# Patient Record
Sex: MALE | Race: WHITE | NOT HISPANIC OR LATINO | Employment: OTHER | URBAN - METROPOLITAN AREA
[De-identification: names, ages, dates, MRNs, and addresses within clinical notes are randomized per-mention and may not be internally consistent; named-entity substitution may affect disease eponyms.]

---

## 2017-03-14 ENCOUNTER — COMMUNICATION - HEALTHEAST (OUTPATIENT)
Dept: FAMILY MEDICINE | Facility: CLINIC | Age: 77
End: 2017-03-14

## 2017-03-14 DIAGNOSIS — I10 ESSENTIAL HYPERTENSION, BENIGN: ICD-10-CM

## 2017-06-19 ENCOUNTER — COMMUNICATION - HEALTHEAST (OUTPATIENT)
Dept: FAMILY MEDICINE | Facility: CLINIC | Age: 77
End: 2017-06-19

## 2017-06-19 DIAGNOSIS — I10 ESSENTIAL HYPERTENSION, BENIGN: ICD-10-CM

## 2017-08-23 ENCOUNTER — OFFICE VISIT - HEALTHEAST (OUTPATIENT)
Dept: FAMILY MEDICINE | Facility: CLINIC | Age: 77
End: 2017-08-23

## 2017-08-23 DIAGNOSIS — R26.81 UNSTEADY GAIT: ICD-10-CM

## 2017-08-23 DIAGNOSIS — R35.0 URINARY FREQUENCY: ICD-10-CM

## 2017-08-23 DIAGNOSIS — Z00.00 WELL ADULT EXAM: ICD-10-CM

## 2017-08-23 DIAGNOSIS — M41.9 SCOLIOSIS: ICD-10-CM

## 2017-08-23 DIAGNOSIS — Z95.5 HISTORY OF HEART ARTERY STENT: ICD-10-CM

## 2017-08-23 DIAGNOSIS — M54.50 LOW BACK PAIN: ICD-10-CM

## 2017-08-23 DIAGNOSIS — I25.10 CORONARY ATHEROSCLEROSIS: ICD-10-CM

## 2017-08-23 DIAGNOSIS — I10 ESSENTIAL HYPERTENSION, BENIGN: ICD-10-CM

## 2017-08-23 DIAGNOSIS — E78.49 OTHER HYPERLIPIDEMIA: ICD-10-CM

## 2017-08-23 DIAGNOSIS — Z86.12 HISTORY OF POLIOMYELITIS: ICD-10-CM

## 2017-08-23 DIAGNOSIS — M19.90 OSTEOARTHRITIS: ICD-10-CM

## 2017-08-23 ASSESSMENT — MIFFLIN-ST. JEOR: SCORE: 1517.49

## 2017-08-25 ENCOUNTER — AMBULATORY - HEALTHEAST (OUTPATIENT)
Dept: LAB | Facility: CLINIC | Age: 77
End: 2017-08-25

## 2017-08-25 DIAGNOSIS — E78.49 OTHER HYPERLIPIDEMIA: ICD-10-CM

## 2017-08-25 DIAGNOSIS — Z00.00 WELL ADULT EXAM: ICD-10-CM

## 2017-08-25 DIAGNOSIS — R35.0 URINARY FREQUENCY: ICD-10-CM

## 2017-08-25 LAB
CHOLEST SERPL-MCNC: 112 MG/DL
FASTING STATUS PATIENT QL REPORTED: YES
HDLC SERPL-MCNC: 41 MG/DL
LDLC SERPL CALC-MCNC: 50 MG/DL
PSA SERPL-MCNC: 1 NG/ML (ref 0–6.5)
TRIGL SERPL-MCNC: 103 MG/DL

## 2017-09-18 ENCOUNTER — RECORDS - HEALTHEAST (OUTPATIENT)
Dept: ADMINISTRATIVE | Facility: OTHER | Age: 77
End: 2017-09-18

## 2017-10-10 ENCOUNTER — RECORDS - HEALTHEAST (OUTPATIENT)
Dept: ADMINISTRATIVE | Facility: OTHER | Age: 77
End: 2017-10-10

## 2017-10-23 ENCOUNTER — RECORDS - HEALTHEAST (OUTPATIENT)
Dept: ADMINISTRATIVE | Facility: OTHER | Age: 77
End: 2017-10-23

## 2017-12-07 ENCOUNTER — COMMUNICATION - HEALTHEAST (OUTPATIENT)
Dept: FAMILY MEDICINE | Facility: CLINIC | Age: 77
End: 2017-12-07

## 2017-12-11 ENCOUNTER — OFFICE VISIT - HEALTHEAST (OUTPATIENT)
Dept: FAMILY MEDICINE | Facility: CLINIC | Age: 77
End: 2017-12-11

## 2017-12-11 DIAGNOSIS — Z86.12 HISTORY OF POLIOMYELITIS: ICD-10-CM

## 2017-12-11 DIAGNOSIS — M25.511 RIGHT SHOULDER PAIN: ICD-10-CM

## 2017-12-11 DIAGNOSIS — R29.898 WEAKNESS OF RIGHT ARM: ICD-10-CM

## 2017-12-11 ASSESSMENT — MIFFLIN-ST. JEOR: SCORE: 1522.6

## 2017-12-12 ENCOUNTER — HOSPITAL ENCOUNTER (OUTPATIENT)
Dept: MRI IMAGING | Facility: HOSPITAL | Age: 77
Discharge: HOME | End: 2017-12-12
Attending: FAMILY MEDICINE

## 2017-12-12 DIAGNOSIS — M25.511 RIGHT SHOULDER PAIN: ICD-10-CM

## 2017-12-13 ENCOUNTER — AMBULATORY - HEALTHEAST (OUTPATIENT)
Dept: FAMILY MEDICINE | Facility: CLINIC | Age: 77
End: 2017-12-13

## 2017-12-15 ENCOUNTER — RECORDS - HEALTHEAST (OUTPATIENT)
Dept: ADMINISTRATIVE | Facility: OTHER | Age: 77
End: 2017-12-15

## 2018-07-24 ENCOUNTER — OFFICE VISIT - HEALTHEAST (OUTPATIENT)
Dept: FAMILY MEDICINE | Facility: CLINIC | Age: 78
End: 2018-07-24

## 2018-07-24 DIAGNOSIS — I25.10 CORONARY ATHEROSCLEROSIS: ICD-10-CM

## 2018-07-24 DIAGNOSIS — Z95.5 HISTORY OF HEART ARTERY STENT: ICD-10-CM

## 2018-07-24 DIAGNOSIS — H26.9 BILATERAL CATARACTS: ICD-10-CM

## 2018-07-24 DIAGNOSIS — G54.0 BRACHIAL PLEXOPATHY: ICD-10-CM

## 2018-07-24 DIAGNOSIS — E78.49 OTHER HYPERLIPIDEMIA: ICD-10-CM

## 2018-07-24 DIAGNOSIS — I10 ESSENTIAL HYPERTENSION, BENIGN: ICD-10-CM

## 2018-07-24 DIAGNOSIS — R33.9 INCOMPLETE BLADDER EMPTYING: ICD-10-CM

## 2018-07-24 DIAGNOSIS — Z86.12 HISTORY OF POLIOMYELITIS: ICD-10-CM

## 2018-07-24 DIAGNOSIS — Z01.818 ENCOUNTER FOR PREOPERATIVE EXAMINATION FOR GENERAL SURGICAL PROCEDURE: ICD-10-CM

## 2018-07-24 ASSESSMENT — MIFFLIN-ST. JEOR: SCORE: 1521.18

## 2018-09-08 ENCOUNTER — COMMUNICATION - HEALTHEAST (OUTPATIENT)
Dept: FAMILY MEDICINE | Facility: CLINIC | Age: 78
End: 2018-09-08

## 2018-09-08 DIAGNOSIS — I10 ESSENTIAL HYPERTENSION, BENIGN: ICD-10-CM

## 2018-09-08 DIAGNOSIS — E78.49 OTHER HYPERLIPIDEMIA: ICD-10-CM

## 2018-09-09 ENCOUNTER — COMMUNICATION - HEALTHEAST (OUTPATIENT)
Dept: FAMILY MEDICINE | Facility: CLINIC | Age: 78
End: 2018-09-09

## 2018-09-09 DIAGNOSIS — I10 ESSENTIAL HYPERTENSION, BENIGN: ICD-10-CM

## 2019-09-04 ENCOUNTER — COMMUNICATION - HEALTHEAST (OUTPATIENT)
Dept: FAMILY MEDICINE | Facility: CLINIC | Age: 79
End: 2019-09-04

## 2019-09-04 DIAGNOSIS — E78.49 OTHER HYPERLIPIDEMIA: ICD-10-CM

## 2019-09-04 DIAGNOSIS — I10 ESSENTIAL HYPERTENSION, BENIGN: ICD-10-CM

## 2019-09-10 ENCOUNTER — RECORDS - HEALTHEAST (OUTPATIENT)
Dept: ADMINISTRATIVE | Facility: OTHER | Age: 79
End: 2019-09-10

## 2019-09-23 ENCOUNTER — OFFICE VISIT - HEALTHEAST (OUTPATIENT)
Dept: FAMILY MEDICINE | Facility: CLINIC | Age: 79
End: 2019-09-23

## 2019-09-23 DIAGNOSIS — Z95.5 HISTORY OF HEART ARTERY STENT: ICD-10-CM

## 2019-09-23 DIAGNOSIS — Z86.12 HISTORY OF POLIOMYELITIS: ICD-10-CM

## 2019-09-23 DIAGNOSIS — E78.49 OTHER HYPERLIPIDEMIA: ICD-10-CM

## 2019-09-23 DIAGNOSIS — R33.9 INCOMPLETE BLADDER EMPTYING: ICD-10-CM

## 2019-09-23 DIAGNOSIS — M79.645 BILATERAL THUMB PAIN: ICD-10-CM

## 2019-09-23 DIAGNOSIS — R20.2 NUMBNESS AND TINGLING IN BOTH HANDS: ICD-10-CM

## 2019-09-23 DIAGNOSIS — R20.0 NUMBNESS AND TINGLING IN BOTH HANDS: ICD-10-CM

## 2019-09-23 DIAGNOSIS — I10 ESSENTIAL HYPERTENSION, BENIGN: ICD-10-CM

## 2019-09-23 DIAGNOSIS — Z12.5 SCREENING FOR PROSTATE CANCER: ICD-10-CM

## 2019-09-23 DIAGNOSIS — Z00.01 ENCOUNTER FOR GENERAL ADULT MEDICAL EXAMINATION WITH ABNORMAL FINDINGS: ICD-10-CM

## 2019-09-23 DIAGNOSIS — I25.10 ATHEROSCLEROSIS OF NATIVE CORONARY ARTERY OF NATIVE HEART WITHOUT ANGINA PECTORIS: ICD-10-CM

## 2019-09-23 DIAGNOSIS — R26.81 UNSTEADY GAIT: ICD-10-CM

## 2019-09-23 DIAGNOSIS — M41.9 SCOLIOSIS, UNSPECIFIED SCOLIOSIS TYPE, UNSPECIFIED SPINAL REGION: ICD-10-CM

## 2019-09-23 DIAGNOSIS — M79.644 BILATERAL THUMB PAIN: ICD-10-CM

## 2019-09-23 LAB
ALBUMIN SERPL-MCNC: 3.7 G/DL (ref 3.5–5)
ALP SERPL-CCNC: 64 U/L (ref 45–120)
ALT SERPL W P-5'-P-CCNC: 24 U/L (ref 0–45)
ANION GAP SERPL CALCULATED.3IONS-SCNC: 8 MMOL/L (ref 5–18)
AST SERPL W P-5'-P-CCNC: 22 U/L (ref 0–40)
BILIRUB SERPL-MCNC: 0.9 MG/DL (ref 0–1)
BUN SERPL-MCNC: 18 MG/DL (ref 8–28)
CALCIUM SERPL-MCNC: 9.4 MG/DL (ref 8.5–10.5)
CHLORIDE BLD-SCNC: 106 MMOL/L (ref 98–107)
CHOLEST SERPL-MCNC: 111 MG/DL
CO2 SERPL-SCNC: 29 MMOL/L (ref 22–31)
CREAT SERPL-MCNC: 0.71 MG/DL (ref 0.7–1.3)
FASTING STATUS PATIENT QL REPORTED: NORMAL
GFR SERPL CREATININE-BSD FRML MDRD: >60 ML/MIN/1.73M2
GLUCOSE BLD-MCNC: 96 MG/DL (ref 70–125)
HDLC SERPL-MCNC: 44 MG/DL
LDLC SERPL CALC-MCNC: 46 MG/DL
POTASSIUM BLD-SCNC: 4.4 MMOL/L (ref 3.5–5)
PROT SERPL-MCNC: 6.4 G/DL (ref 6–8)
PSA SERPL-MCNC: 1.1 NG/ML (ref 0–6.5)
SODIUM SERPL-SCNC: 143 MMOL/L (ref 136–145)
TRIGL SERPL-MCNC: 107 MG/DL

## 2019-09-23 ASSESSMENT — MIFFLIN-ST. JEOR: SCORE: 1529.46

## 2019-09-30 ENCOUNTER — RECORDS - HEALTHEAST (OUTPATIENT)
Dept: ADMINISTRATIVE | Facility: OTHER | Age: 79
End: 2019-09-30

## 2019-10-14 ENCOUNTER — RECORDS - HEALTHEAST (OUTPATIENT)
Dept: ADMINISTRATIVE | Facility: OTHER | Age: 79
End: 2019-10-14

## 2019-10-21 ENCOUNTER — RECORDS - HEALTHEAST (OUTPATIENT)
Dept: ADMINISTRATIVE | Facility: OTHER | Age: 79
End: 2019-10-21

## 2020-01-09 ENCOUNTER — RECORDS - HEALTHEAST (OUTPATIENT)
Dept: ADMINISTRATIVE | Facility: OTHER | Age: 80
End: 2020-01-09

## 2020-03-02 ENCOUNTER — HEALTH MAINTENANCE LETTER (OUTPATIENT)
Age: 80
End: 2020-03-02

## 2020-07-31 ENCOUNTER — RECORDS - HEALTHEAST (OUTPATIENT)
Dept: ADMINISTRATIVE | Facility: OTHER | Age: 80
End: 2020-07-31

## 2020-08-14 ENCOUNTER — COMMUNICATION - HEALTHEAST (OUTPATIENT)
Dept: FAMILY MEDICINE | Facility: CLINIC | Age: 80
End: 2020-08-14

## 2020-08-19 ENCOUNTER — COMMUNICATION - HEALTHEAST (OUTPATIENT)
Dept: FAMILY MEDICINE | Facility: CLINIC | Age: 80
End: 2020-08-19

## 2020-08-20 ENCOUNTER — OFFICE VISIT - HEALTHEAST (OUTPATIENT)
Dept: FAMILY MEDICINE | Facility: CLINIC | Age: 80
End: 2020-08-20

## 2020-08-20 DIAGNOSIS — Z95.5 HISTORY OF HEART ARTERY STENT: ICD-10-CM

## 2020-08-20 DIAGNOSIS — M24.021 LOOSE BODY IN RIGHT ELBOW: ICD-10-CM

## 2020-08-20 DIAGNOSIS — Z01.818 PREOP GENERAL PHYSICAL EXAM: ICD-10-CM

## 2020-08-20 DIAGNOSIS — M19.021 OSTEOARTHRITIS OF RIGHT ELBOW, UNSPECIFIED OSTEOARTHRITIS TYPE: ICD-10-CM

## 2020-08-20 DIAGNOSIS — G56.21 CUBITAL TUNNEL SYNDROME ON RIGHT: ICD-10-CM

## 2020-08-20 DIAGNOSIS — R33.9 INCOMPLETE BLADDER EMPTYING: ICD-10-CM

## 2020-08-20 DIAGNOSIS — I25.10 ATHEROSCLEROSIS OF NATIVE CORONARY ARTERY OF NATIVE HEART WITHOUT ANGINA PECTORIS: ICD-10-CM

## 2020-08-20 DIAGNOSIS — I10 ESSENTIAL HYPERTENSION, BENIGN: ICD-10-CM

## 2020-08-20 DIAGNOSIS — E78.49 OTHER HYPERLIPIDEMIA: ICD-10-CM

## 2020-08-20 DIAGNOSIS — Z86.12 HISTORY OF POLIOMYELITIS: ICD-10-CM

## 2020-08-20 LAB
ALBUMIN SERPL-MCNC: 3.8 G/DL (ref 3.5–5)
ALP SERPL-CCNC: 54 U/L (ref 45–120)
ALT SERPL W P-5'-P-CCNC: 27 U/L (ref 0–45)
ANION GAP SERPL CALCULATED.3IONS-SCNC: 10 MMOL/L (ref 5–18)
AST SERPL W P-5'-P-CCNC: 22 U/L (ref 0–40)
ATRIAL RATE - MUSE: 74 BPM
BILIRUB SERPL-MCNC: 1.2 MG/DL (ref 0–1)
BUN SERPL-MCNC: 16 MG/DL (ref 8–28)
CALCIUM SERPL-MCNC: 9.2 MG/DL (ref 8.5–10.5)
CHLORIDE BLD-SCNC: 104 MMOL/L (ref 98–107)
CHOLEST SERPL-MCNC: 116 MG/DL
CO2 SERPL-SCNC: 26 MMOL/L (ref 22–31)
CREAT SERPL-MCNC: 0.75 MG/DL (ref 0.7–1.3)
DIASTOLIC BLOOD PRESSURE - MUSE: NORMAL
FASTING STATUS PATIENT QL REPORTED: YES
GFR SERPL CREATININE-BSD FRML MDRD: >60 ML/MIN/1.73M2
GLUCOSE BLD-MCNC: 93 MG/DL (ref 70–125)
HDLC SERPL-MCNC: 43 MG/DL
INTERPRETATION ECG - MUSE: NORMAL
LDLC SERPL CALC-MCNC: 53 MG/DL
P AXIS - MUSE: 40 DEGREES
POTASSIUM BLD-SCNC: 4.2 MMOL/L (ref 3.5–5)
PR INTERVAL - MUSE: 186 MS
PROT SERPL-MCNC: 6.4 G/DL (ref 6–8)
QRS DURATION - MUSE: 88 MS
QT - MUSE: 372 MS
QTC - MUSE: 412 MS
R AXIS - MUSE: 25 DEGREES
SODIUM SERPL-SCNC: 140 MMOL/L (ref 136–145)
SYSTOLIC BLOOD PRESSURE - MUSE: NORMAL
T AXIS - MUSE: 62 DEGREES
TRIGL SERPL-MCNC: 99 MG/DL
VENTRICULAR RATE- MUSE: 74 BPM

## 2020-08-20 ASSESSMENT — MIFFLIN-ST. JEOR: SCORE: 1533.2

## 2020-08-27 ENCOUNTER — RECORDS - HEALTHEAST (OUTPATIENT)
Dept: ADMINISTRATIVE | Facility: OTHER | Age: 80
End: 2020-08-27

## 2020-09-08 ENCOUNTER — RECORDS - HEALTHEAST (OUTPATIENT)
Dept: ADMINISTRATIVE | Facility: OTHER | Age: 80
End: 2020-09-08

## 2020-09-09 ENCOUNTER — RECORDS - HEALTHEAST (OUTPATIENT)
Dept: ADMINISTRATIVE | Facility: OTHER | Age: 80
End: 2020-09-09

## 2020-09-10 ENCOUNTER — RECORDS - HEALTHEAST (OUTPATIENT)
Dept: ADMINISTRATIVE | Facility: OTHER | Age: 80
End: 2020-09-10

## 2020-09-10 ENCOUNTER — RECORDS - HEALTHEAST (OUTPATIENT)
Dept: LAB | Facility: CLINIC | Age: 80
End: 2020-09-10

## 2020-09-10 ENCOUNTER — COMMUNICATION - HEALTHEAST (OUTPATIENT)
Dept: FAMILY MEDICINE | Facility: CLINIC | Age: 80
End: 2020-09-10

## 2020-09-13 LAB
BACTERIA SPEC CULT: NO GROWTH
BACTERIA SPEC CULT: NORMAL
GRAM STAIN RESULT: NORMAL
GRAM STAIN RESULT: NORMAL

## 2020-09-14 ENCOUNTER — RECORDS - HEALTHEAST (OUTPATIENT)
Dept: ADMINISTRATIVE | Facility: OTHER | Age: 80
End: 2020-09-14

## 2020-09-21 ENCOUNTER — RECORDS - HEALTHEAST (OUTPATIENT)
Dept: ADMINISTRATIVE | Facility: OTHER | Age: 80
End: 2020-09-21

## 2020-10-12 ENCOUNTER — RECORDS - HEALTHEAST (OUTPATIENT)
Dept: ADMINISTRATIVE | Facility: OTHER | Age: 80
End: 2020-10-12

## 2020-12-20 ENCOUNTER — HEALTH MAINTENANCE LETTER (OUTPATIENT)
Age: 80
End: 2020-12-20

## 2021-02-25 ENCOUNTER — IMMUNIZATION (OUTPATIENT)
Dept: NURSING | Facility: CLINIC | Age: 81
End: 2021-02-25
Payer: COMMERCIAL

## 2021-02-25 PROCEDURE — 91300 PR COVID VAC PFIZER DIL RECON 30 MCG/0.3 ML IM: CPT

## 2021-02-25 PROCEDURE — 0001A PR COVID VAC PFIZER DIL RECON 30 MCG/0.3 ML IM: CPT

## 2021-03-18 ENCOUNTER — IMMUNIZATION (OUTPATIENT)
Dept: NURSING | Facility: CLINIC | Age: 81
End: 2021-03-18
Attending: INTERNAL MEDICINE
Payer: COMMERCIAL

## 2021-03-18 PROCEDURE — 91300 PR COVID VAC PFIZER DIL RECON 30 MCG/0.3 ML IM: CPT

## 2021-03-18 PROCEDURE — 0002A PR COVID VAC PFIZER DIL RECON 30 MCG/0.3 ML IM: CPT

## 2021-04-24 ENCOUNTER — HEALTH MAINTENANCE LETTER (OUTPATIENT)
Age: 81
End: 2021-04-24

## 2021-05-29 ENCOUNTER — RECORDS - HEALTHEAST (OUTPATIENT)
Dept: ADMINISTRATIVE | Facility: CLINIC | Age: 81
End: 2021-05-29

## 2021-05-31 VITALS — WEIGHT: 184.31 LBS | BODY MASS INDEX: 27.93 KG/M2 | HEIGHT: 68 IN

## 2021-05-31 VITALS — BODY MASS INDEX: 27.76 KG/M2 | WEIGHT: 183.19 LBS | HEIGHT: 68 IN

## 2021-06-01 ENCOUNTER — RECORDS - HEALTHEAST (OUTPATIENT)
Dept: ADMINISTRATIVE | Facility: CLINIC | Age: 81
End: 2021-06-01

## 2021-06-01 VITALS — BODY MASS INDEX: 27.89 KG/M2 | WEIGHT: 184 LBS | HEIGHT: 68 IN

## 2021-06-01 NOTE — TELEPHONE ENCOUNTER
Please contact this patient and help him schedule follow-up appointment/physical.  He is due to have labs checked.  I can refill the medications he needs for now, but he needs to be seen for follow-up visit. -DE

## 2021-06-01 NOTE — TELEPHONE ENCOUNTER
RN cannot approve Refill Request    RN can NOT refill this medication Protocol failed and NO refill given      Kellen Galvan, [facility] Triage/Med Refill 9/4/2019    Requested Prescriptions   Pending Prescriptions Disp Refills     metoprolol succinate (TOPROL-XL) 50 MG 24 hr tablet [Pharmacy Med Name: METOPROLOL SUCC ER 50 MG TAB] 90 tablet 3     Sig: TAKE ONE TABLET BY MOUTH ONE TIME DAILY       Beta-Blockers Refill Protocol Failed - 9/4/2019  1:18 AM        Failed - PCP or prescribing provider visit in past 12 months or next 3 months     Last office visit with prescriber/PCP: 12/11/2017 Silvano Naranjo DO OR same dept: Visit date not found OR same specialty: 12/11/2017 Silvano Naranjo DO  Last physical: 7/24/2018 Last MTM visit: Visit date not found   Next visit within 3 mo: Visit date not found  Next physical within 3 mo: Visit date not found  Prescriber OR PCP: Silvano Montenegro DO  Last diagnosis associated with med order: 1. Benign Essential Hypertension  - metoprolol succinate (TOPROL-XL) 50 MG 24 hr tablet [Pharmacy Med Name: METOPROLOL SUCC ER 50 MG TAB]; TAKE ONE TABLET BY MOUTH ONE TIME DAILY  Dispense: 90 tablet; Refill: 3  - losartan (COZAAR) 50 MG tablet [Pharmacy Med Name: LOSARTAN POTASSIUM 50 MG TAB]; TAKE ONE TABLET BY MOUTH ONE TIME DAILY  Dispense: 90 tablet; Refill: 3    2. Other hyperlipidemia  - atorvastatin (LIPITOR) 10 MG tablet [Pharmacy Med Name: ATORVASTATIN 10 MG TABLET]; TAKE ONE TABLET BY MOUTH EVERY DAY AS DIRECTED  Dispense: 90 tablet; Refill: 3    If protocol passes may refill for 12 months if within 3 months of last provider visit (or a total of 15 months).             Failed - Blood pressure filed in past 12 months     BP Readings from Last 1 Encounters:   07/24/18 110/64             atorvastatin (LIPITOR) 10 MG tablet [Pharmacy Med Name: ATORVASTATIN 10 MG TABLET] 90 tablet 3     Sig: TAKE ONE TABLET BY MOUTH EVERY DAY AS DIRECTED        Statins Refill Protocol (Hmg CoA Reductase Inhibitors) Failed - 9/4/2019  1:18 AM        Failed - PCP or prescribing provider visit in past 12 months      Last office visit with prescriber/PCP: 12/11/2017 Silvano Naranjo DO OR [facility] de[facility]: Visit date not found OR same specialty: 12/11/2017 Silvano Naranjo DO  Last physical: 7/24/2018 Last MTM visit: Visit date not found   Next visit within 3 mo: Visit date not found  Next physical within 3 mo: Visit date not found  Prescriber OR PCP: Silvano Montenegro DO  Last diagnosis associated with med order: 1. Benign Essential Hypertension  - metoprolol succinate (TOPROL-XL) 50 MG 24 hr tablet [Pharmacy Med Name: METOPROLOL SUCC ER 50 MG TAB]; TAKE ONE TABLET BY MOUTH ONE TIME DAILY  Dispense: 90 tablet; Refill: 3  - losartan (COZAAR) 50 MG tablet [Pharmacy Med Name: LOSARTAN POTASSIUM 50 MG TAB]; TAKE ONE TABLET BY MOUTH ONE TIME DAILY  Dispense: 90 tablet; Refill: 3    2. Other hyperlipidemia  - atorvastatin (LIPITOR) 10 MG tablet [Pharmacy Med Name: ATORVASTATIN 10 MG TABLET]; TAKE ONE TABLET BY MOUTH EVERY DAY AS DIRECTED  Dispense: 90 tablet; Refill: 3    If protocol passes may refill for 12 months if within 3 months of last provider visit (or a total of 15 months).             losartan (COZAAR) 50 MG tablet [Pharmacy Med Name: LOSARTAN POTASSIUM 50 MG TAB] 90 tablet 3     Sig: TAKE ONE TABLET BY MOUTH ONE TIME DAILY       Angiotensin Receptor Blocker Protocol Failed - 9/4/2019  1:18 AM        Failed - PCP or prescribing provider visit in past 12 months       Last office visit with prescriber/PCP: 12/11/2017 Silvano Naranjo DO OR [facility] de[facility]: Visit date not found OR same specialty: 12/11/2017 Silvano Naranjo DO  Last physical: 7/24/2018 Last MTM visit: Visit date not found   Next visit within 3 mo: Visit date not found  Next physical within 3 mo: Visit date not found  Prescriber OR PCP: Silvano Rodriguez  DO Lan  Last diagnosis associated with med order: 1. Benign Essential Hypertension  - metoprolol succinate (TOPROL-XL) 50 MG 24 hr tablet [Pharmacy Med Name: METOPROLOL SUCC ER 50 MG TAB]; TAKE ONE TABLET BY MOUTH ONE TIME DAILY  Dispense: 90 tablet; Refill: 3  - losartan (COZAAR) 50 MG tablet [Pharmacy Med Name: LOSARTAN POTASSIUM 50 MG TAB]; TAKE ONE TABLET BY MOUTH ONE TIME DAILY  Dispense: 90 tablet; Refill: 3    2. Other hyperlipidemia  - atorvastatin (LIPITOR) 10 MG tablet [Pharmacy Med Name: ATORVASTATIN 10 MG TABLET]; TAKE ONE TABLET BY MOUTH EVERY DAY AS DIRECTED  Dispense: 90 tablet; Refill: 3    If protocol passes may refill for 12 months if within 3 months of last provider visit (or a total of 15 months).             Failed - Serum potassium within the past 12 months     No results found for: LN-POTASSIUM          Failed - Blood pressure filed in past 12 months     BP Readings from Last 1 Encounters:   07/24/18 110/64             Failed - Serum creatinine within the past 12 months     Creatinine   Date Value Ref Range Status   08/25/2017 0.71 0.70 - 1.30 mg/dL Final

## 2021-06-01 NOTE — PROGRESS NOTES
Assessment and Plan:     1. Encounter for general adult medical examination with abnormal findings  I encouraged him to eat a healthy diet and get regular exercise.  We are going to check labs today.    2. Other hyperlipidemia  He will continue atorvastatin we are going to check a cholesterol panel and CMP today.  - atorvastatin (LIPITOR) 10 MG tablet; Take 1 tablet (10 mg total) by mouth daily. As directed  Dispense: 90 tablet; Refill: 3  - Comprehensive Metabolic Panel  - Lipid Cascade    3. Benign Essential Hypertension  Blood pressures well controlled on losartan and metoprolol.  Refills were given.  - losartan (COZAAR) 50 MG tablet; Take 1 tablet (50 mg total) by mouth daily.  Dispense: 90 tablet; Refill: 3  - metoprolol succinate (TOPROL-XL) 50 MG 24 hr tablet; Take 1 tablet (50 mg total) by mouth daily.  Dispense: 90 tablet; Refill: 3    4. Bilateral thumb pain  The pain at the base of both of his thumbs is likely secondary to osteoarthritis.  He was given a referral to see an orthopedic hand specialist about this.  - Ambulatory referral to Orthopedic Surgery    5. Numbness and tingling in both hands  Numbness and tingling symptoms in both of his hands is consistent with carpal tunnel syndrome.  He was given a referral to see an orthopedic hand specialist regarding this issue as well  - Ambulatory referral to Orthopedic Surgery    6. Atherosclerosis of native coronary artery of native heart without angina pectoris  Stable on atorvastatin, aspirin, metoprolol and losartan.    7. History of heart artery stent  Stable.    8. History of poliomyelitis  He may continue to use walking poles to help with ambulation.    9. Scoliosis, unspecified scoliosis type, unspecified spinal region  He will continue to stay physically active.    10. Unsteady gait  He may continue to use walking poles to help with ambulation.    11. Incomplete bladder emptying  - tamsulosin (FLOMAX) 0.4 mg cap; Take 1 capsule (0.4 mg total) by  mouth Daily after breakfast.  Dispense: 90 capsule; Refill: 3    12. Screening for prostate cancer  - PSA (Prostatic-Specific Antigen), Annual Screen     The patient's current medical problems were reviewed.      The following health maintenance schedule was reviewed with the patient and provided in printed form in the after visit summary:   Health Maintenance   Topic Date Due     ZOSTER VACCINES (1 of 2) 07/01/1992     PNEUMOCOCCAL IMMUNIZATION 65+ LOW/MEDIUM RISK (1 of 2 - PCV13) 07/01/2007     MEDICARE ANNUAL WELLNESS VISIT  08/23/2018     INFLUENZA VACCINE RULE BASED (1) 08/01/2019     FALL RISK ASSESSMENT  09/23/2020     ADVANCE CARE PLANNING  05/25/2021     TD 18+ HE  10/17/2021        Subjective:   Chief Complaint: Silvano Cruz is an 79 y.o. male here for an Annual Wellness visit.   HPI:  He has a history of coronary artery disease and a subsequent stent placement in 2009, hypertention, hyperlipidemia, incomplete bladder emptying, right upper extremity brachial plexopathy, scoliosis and polio.  He is on losartan, metoprolol and low dose of atorvastatin which he tolerates well.  He is due for refills of these medications.  He is concerned today about pain at the base of both thumbs and numbness and tingling symptoms he has been experiencing in both hands at night.  The symptoms of been going on for the past 3-4 months.  He denies any recent injuries.  However, he remembers having a crushing injury to the left thumb/hand about 20 years ago.  There were no fractures discovered at that time.    Social history:      Review of Systems:   Please see above.  The rest of the review of systems are negative for all systems.    Patient Care Team:  Silvano Naranjo DO as PCP - General (Family Medicine)  Silvano Naranjo DO as Assigned PCP     Patient Active Problem List   Diagnosis     Seborrheic Keratosis     Osteoarthritis     Hyperlipidemia     Benign Essential Hypertension      Coronary Artery Disease     Scoliosis     History of poliomyelitis     History of heart artery stent     Unsteady gait     Brachial plexopathy     Incomplete bladder emptying     No past medical history on file.   Past Surgical History:   Procedure Laterality Date     ANKLE FUSION Bilateral Right 2015, Left at age 15     TOTAL KNEE ARTHROPLASTY Right 2009      Family History   Problem Relation Age of Onset     Stroke Mother      Lung cancer Father      Harley's esophagus Father       Social History     Socioeconomic History     Marital status:      Spouse name: Not on file     Number of children: Not on file     Years of education: Not on file     Highest education level: Not on file   Occupational History     Not on file   Social Needs     Financial resource strain: Not on file     Food insecurity:     Worry: Not on file     Inability: Not on file     Transportation needs:     Medical: Not on file     Non-medical: Not on file   Tobacco Use     Smoking status: Never Smoker     Smokeless tobacco: Never Used   Substance and Sexual Activity     Alcohol use: Yes     Comment: alittle     Drug use: Never     Sexual activity: Not on file   Lifestyle     Physical activity:     Days per week: Not on file     Minutes per session: Not on file     Stress: Not on file   Relationships     Social connections:     Talks on phone: Not on file     Gets together: Not on file     Attends [employer]ical service: Not on file     Active member of club or organization: Not on file     Attends meetings of clubs or organizations: Not on file     Relationship status: Not on file     Intimate partner violence:     Fear of current or ex partner: Not on file     Emotionally abused: Not on file     Physically abused: Not on file     Forced sexual activity: Not on file   Other Topics Concern     Not on file   Social History Narrative     Not on file      Current Outpatient Medications   Medication Sig Dispense Refill     aspirin 81 MG EC tablet  "Take 81 mg by mouth daily.       atorvastatin (LIPITOR) 10 MG tablet TAKE ONE TABLET BY MOUTH EVERY DAY AS DIRECTED 90 tablet 3     losartan (COZAAR) 50 MG tablet TAKE ONE TABLET BY MOUTH ONE TIME DAILY 90 tablet 3     metoprolol succinate (TOPROL-XL) 50 MG 24 hr tablet TAKE ONE TABLET BY MOUTH ONE TIME DAILY 90 tablet 3     tamsulosin (FLOMAX) 0.4 mg Cp24 daily.  5     acetaminophen (TYLENOL EXTRA STRENGTH) 500 MG tablet Take 500 mg by mouth every 4 (four) hours as needed for pain.       prednisoLONE acetate (PRED-FORTE) 1 % ophthalmic suspension        No current facility-administered medications for this visit.       Objective:   Vital Signs:   Visit Vitals  /70 (Patient Site: Left Arm, Patient Position: Sitting, Cuff Size: Adult Regular)   Pulse 76   Temp 98.3  F (36.8  C) (Oral)   Resp 16   Ht 5' 7.7\" (1.72 m) Comment: with boots and braces on.   Wt 185 lb 2 oz (84 kg)   BMI 28.40 kg/m         VisionScreening:  No exam data present     PHYSICAL EXAM  General Appearance: Alert, cooperative, no distress, appears stated age  Head: Normocephalic, without obvious abnormality, atraumatic  Eyes: PERRL, conjunctiva/corneas clear, EOM's intact  Ears: Normal TM's and external ear canals, both ears  Nose: Nares normal, septum midline,mucosa normal, no drainage  Throat: Lips, mucosa, and tongue normal; teeth and gums normal  Neck: Supple, symmetrical, trachea midline, no adenopathy;  thyroid: not enlarged, symmetric, no tenderness/mass/nodules  Back: Scoliosis present, no CVA tenderness  Lungs: Clear to auscultation bilaterally, respirations unlabored  Heart: Regular rate and rhythm, S1 and S2 normal, no murmur, rub, or gallop,  Abdomen: Soft, non-tender, bowel sounds active all four quadrants,  no masses, no organomegaly  Extremities: Extremities atraumatic, no cyanosis or edema.  There is mild-moderate discomfort with movements that involve the base of his left thumb.  Tinel's testing positive in the right wrist " and negative on the left.  Phalen's testing negative bilaterally.  Gait is unsteady.  He uses walking poles to help with ambulation.  Skin: Skin color, texture, turgor normal, no rashes.  There is a scaly lesion with trace underlying erythema just behind the right ear.  Lymph nodes: Cervical, supraclavicular, and axillary nodes normal  Neurologic: He is alert.  Normal speech.  No focal deficits.  Normal deep tendon reflexes.   Psychiatric: He has a normal mood and affect.       Assessment Results 9/23/2019   Activities of Daily Living No help needed   Instrumental Activities of Daily Living 1 - Full function   Mini Cog Total Score 5   Some recent data might be hidden     A Mini-Cog score of 0-2 suggests the possibility of dementia, score of 3-5 suggests no dementia    Identified Health Risks:     He is at risk for lack of exercise and has been provided with information to increase physical activity for the benefit of his well-being.  The patient reports that he has difficulty with instrumental activities of daily living.  He was provided with a list of local organizations that provide support services and advised to make a follow up appointment as needed to address this further.   Information regarding advance directives (living [place]), including where he can download the appropriate form, was provided to the patient via the AVS.

## 2021-06-02 ENCOUNTER — RECORDS - HEALTHEAST (OUTPATIENT)
Dept: ADMINISTRATIVE | Facility: CLINIC | Age: 81
End: 2021-06-02

## 2021-06-03 VITALS
WEIGHT: 185.13 LBS | BODY MASS INDEX: 28.06 KG/M2 | SYSTOLIC BLOOD PRESSURE: 116 MMHG | RESPIRATION RATE: 16 BRPM | HEART RATE: 76 BPM | HEIGHT: 68 IN | DIASTOLIC BLOOD PRESSURE: 70 MMHG | TEMPERATURE: 98.3 F

## 2021-06-04 VITALS
HEART RATE: 80 BPM | DIASTOLIC BLOOD PRESSURE: 58 MMHG | RESPIRATION RATE: 16 BRPM | WEIGHT: 184.9 LBS | BODY MASS INDEX: 28.02 KG/M2 | HEIGHT: 68 IN | SYSTOLIC BLOOD PRESSURE: 92 MMHG

## 2021-06-10 NOTE — TELEPHONE ENCOUNTER
Called and spoke with patient.    Confirmed tomorrow's appointment at the [facility] clinic.  Reminded patient to arrive 15 min early.    Travel screen completed.      Christie Gutierrez, CMA

## 2021-06-10 NOTE — TELEPHONE ENCOUNTER
New Appointment Needed  What is the reason for the visit:    Pre-Op Appt Request  When is the surgery? :  8/27  Where is the surgery?:   [facility]  Who is the surgeon? :  Dr. Ndiaye  What type of surgery is being done?: Rt Arthroscopic Elbow  Provider Preference: Any available  How soon do you need to be seen?: Before 8/27  Waitlist offered?: No  Okay to leave a detailed message:  Yes

## 2021-06-10 NOTE — PATIENT INSTRUCTIONS - HE
"  Preparing for Your Surgery  Getting started  A surgery nurse will call you to review your health history and instructions. They will give you an arrival time based on your scheduled surgery time.  Please be ready to share the following:    Your doctor's clinic name and phone number    Your medical, surgical and anesthesia history    A list of allergies and sensitivities    A list of medicines, including herbal treatments and over-the-counter drugs    Whether the patient has a legal guardian (ask how to send us the papers in advance)  If your child is having surgery, please ask for a copy of Preparing for Your Child's Surgery.    Preparing for surgery    Within 30 days of surgery: Have an exam at your family clinic (primary care clinic), or go to a pre-operative clinic. This exam is called a \"History and Physical,\" or H&P.    At your H&P exam, talk to your care team about all medicines you take. If you need to stop any medicines before surgery, ask when to start taking them again.  ? We do this for your safety. Many medicines can make you bleed too much during surgery. Some change how well surgery (anesthesia) drugs work.    Call your insurance company to see what it will and won't pay for. Ask if they need to pre-approve the surgery. (If no insurance, call [phone].)    Call your surgeon's clinic if there's any change in your health. This includes signs of a cold or flu (sore throat, runny nose, cough, rash, fever). It also includes a scrape or scratch near the surgery site.    If you have questions on the day of surgery, call your surgery center.  Eating and drinking guidelines  For your safety: Unless your surgeon tells you otherwise, follow the guidelines below.    Eat and drink as usual until 8 hours before surgery. After that, no food or milk.    Drink clear liquids until 2 hours before surgery. These are liquids you can see through, like water, Gatorade and Propel Water. You may also have black coffee " and tea (no cream or milk).    Nothing by mouth within 2 hours of surgery. This includes gum, candy and breath mints.    Stop alcohol the midnight before surgery.    If your family clinic tells you to take medicine on the morning of surgery, it's okay to take it with a sip of water.  Preventing infection    Shower or bathe the night before and morning of your surgery. Follow the instructions your clinic gave you. (If no instructions, use regular soap.)    Don't shave or clip hair near your surgery site. This can lead to skin infection.    Don't smoke the morning of surgery. Smoking increases the risk of infection. You may chew nicotine gum up to 2 hours before surgery. A nicotine patch is okay.  ? Note: Some surgeries require you to completely quit smoking and nicotine. Check with your surgeon.    Your care team will make every effort to keep you safe from infection. We will:  ? Clean our hands often with soap and water (or an alcohol-based hand rub).  ? Clean the skin at your surgery site with a special soap that kills germs. We'll also remove hair from the site as needed.  ? Wear special hair covers, masks, gowns and gloves during surgery.  ? Give antibiotic medicine, if prescribed. Not all surgeries need antibiotics.  What to bring on the day of surgery    Photo ID and insurance card    Copy of your health care directive, if you have one    Glasses and hearing aides (bring cases)  ? You can't wear contacts during surgery    Inhaler and eye drops, if you use them (tell us about these when you arrive)    CPAP machine or breathing device, if you use them    A few personal items, if spending the night    If you have . . .  ? A pacemaker or ICD (cardiac defibrillator): Bring the ID card.  ? An implanted stimulator: Bring the remote control.  ? A legal guardian: Bring a copy of the certified (court-stamped) guardianship papers.  Please remove any jewelry, including body piercings. Leave jewelry and other valuables at  home.  If you're going home the day of surgery  Important: If you don't follow the rules below, we must cancel your surgery.     Arrange for someone to drive you home after surgery. You may not drive, take a taxi or take public transportation by yourself (unless you'll have local anesthesia only).    Arrange for a responsible adult to stay with you overnight. If you don't, we may keep you in the hospital overnight, and you may need to pay the costs yourself.  Questions?   If you have any questions for your care team, list them here: _________________________________________________________________________________________________________________________________________________________________________________________________________________________________________________________________________________________________________________________  For informational purposes only. Not to replace the advice of your health care provider. Copyright   5004-1280 [employer]. All rights reserved. Clinically reviewed by Dianelys Messer MD. [employer] [id number] - REV 07/19.      Before Your Procedure or Hospital Admission  Testing for COVID-19 (Coronavirus)  Thank you for choosing [facility] for your health care needs. This is a very challenging time for everyone. The World Health Organization and the State of [place] have declared the COVID-19 virus a pandemic.   Our goal is to keep you and our team here at [facility] safe and healthy. We've taken several steps to make this happen. For example:    We screen our staff, care teams and patients for COVID-19.    Everyone at [facility] must wear a mask and stay 6 feet apart.    We are limiting hospital and clinic visitors.  Before you come in  All patients must get tested for COVID-19. Your test needs to happen 1 to 4 days before you check in to the hospital or surgery site.  We'll call about a week in advance to set up a testing time. The call may come  from a phone number you don't know. Then, you'll need to travel to a testing clinic, where we'll take a swab of your nose or throat.   After the test, please stay at home and stay out of contact with other people. It will be harder for you to recover if you get COVID-19 before your treatment.   Please follow all current safety guidelines, including:    Limit trips outside your home.    Limit the number of people you see.    Always wear a mask outside your home.    Use social distancing. (Stay 6 feet away from others whenever you can.)    Wash your hands often.  If your test shows you have COVID-19  If your test is positive, we'll let you know. A positive test means that you have the virus.   We'll probably have to postpone your admission, surgery or procedure. Your doctor will discuss this with you. After that, we'll let you know what to do and when you can reschedule.   We may need to cancel your treatment on short notice for other reasons, too.  If your test shows you DON'T have COVID-19  Even if your test is negative, you may still get COVID-19. It's rare but, sometimes, the test result is wrong. You could also catch the virus after taking the test.   There's a very small chance that you could catch COVID-19 in the hospital or surgery center. [facility] has taken many steps to prevent this from happening.   Day of your surgery or procedure    Hold losartan and tamsulosin on morning of surgery.    Please come wearing a mask or something else that covers both your nose and mouth.    When you arrive, we'll ask you some questions to find out if you have any signs or symptoms of COVID-19.    Ask your care team if you can have visitors. All visitors must wear masks and will be screened for signs of COVID-19.  ? Even if no visitors are allowed, you can still have with you:    Your legal guardian or legal decision maker    A parent and one other visitor, if you are younger than 18 years old    A partner and a  , if you are in labor  ? We might need to teach you about taking care of yourself after surgery. If so, a visitor can come into the hospital to learn about it, too.  ? The rules for visitors change often, depending on how much the virus is spreading. To learn more, see Visiting a Loved One in the Hospital during the COVID-19 Outbreak.  Please call your care team, hospital or surgery center if you have any questions. We thank you for your understanding and for choosing [facility] for your care.   Questions and Answers  Does it matter where I get tested for COVID-19?  Yes. We urge you to get tested at one of our [facility] COVID-19 testing sites. We process these tests in our lab and can get the results quickly. Your [facility] care team needs to get your results before you check in.  What should I do if I can't get tested at [facility]?  You can get tested somewhere else, but you'll need to take these extra steps:  1. Contact your family doctor or clinic to arrange your test.  2. Take the test within 4 days of your surgery or procedure. We can't accept tests older than 4 days.  3. Make sure your doctor or clinic faxes your results to [facility] at [phone].  If we don't get your results in time, we may have to postpone or cancel your treatment.  For informational purposes only. Not to replace the advice of your health care provider. Copyright   2020 [facility]. All rights reserved. Clinically reviewed by Infection Prevention and the [facility] COVID-19 Clinical Team. [employer] [id number] - 07/20.

## 2021-06-10 NOTE — PROGRESS NOTES
[place] [address], [address]  [facility] [address]  Dept: [phone]  Dept Fax: [phone]  Primary Provider: Silvano Herrera DO  Pre-op Performing Provider: SILVANO HERRERA    PREOPERATIVE EVALUATION:  Today's date: 8/20/2020    Silvano Cruz is a 80 y.o. male who presents for a preoperative evaluation.    Surgical Information:  Proposed Surgery/ Procedure: RT elbow arthroscopic  Date of Surgery/ Procedure: 8/27/2020  Time of Surgery/ Procedure: Unknown  Hospital/Surgical Facility: [facility] with Dr. Ndiaye  [facility]  No flowsheet data found.  Fax number for surgical facility: [phone]  Type of Anesthesia Anticipated: to be determined    Have you ever had a heart attack or stroke? YES: Heart attack about 12 years ago.  Have you ever had surgery on your heart or blood vessels, such as a stent, coronary (heart) bypass, or surgery on an artery in the head, neck, heart, or legs? YES: Stent.  Do you have chest pain when you are physically active? No  Do you have a history of heart failure? No  Do you currently have a cold, bronchitis, or symptoms of other respiratory (head and chest) infections? No  Do you have a cough, shortness of breath, or wheezing? No  Do you or anyone in your family have a history of blood clots? No  Do you or anyone in your family have a serious bleeding problem, such as long-lasting bleeding after surgeries or cuts? No  Have you ever had anemia or been told to take iron pills? No  Have you had any abnormal blood loss such as black, tarry or bloody stools, or abnormal vaginal bleeding?No  Have you ever had a blood transfusion? No   Are you willing to have a blood transfusion if it is medically needed before, during, or after your surgery? Yes  Have you or anyone in your family ever had problems with anesthesia (sedation for surgery)? No  Do you have sleep apnea, excessive  snoring, or daytime drowsiness? No  Do you have any artifical heart valves or other implanted medical devices, such as a pacemaker, defibrillator, or continuous glucose monitor? No- just the stent.  Do you have any artifical joints? YES: RT knee and ankle fusion.  Are you allergic to latex? No  Is there any chance that you may be pregnant? No      Subjective     HPI related to upcoming procedure: He has a history of right elbow pain symptoms.  He was seen by an orthopedic specialist had an x-ray which showed significant degenerative changes with a large loose body.  He also has symptoms consistent with cubital tunnel syndrome and arthritis.  He is scheduled for an arthroscopic right elbow surgery for cubital tunnel release and excision of the large loose body.    He has a history of coronary artery disease and a subsequent stent placement in 2009,  hypertension, hyperlipidemia, incomplete bladder emptying, right upper extremity brachial plexopathy, scoliosis and polio.  He is on losartan, metoprolol and low dose of atorvastatin which he tolerates well.    No flowsheet data found.      Patient does not have a Health Care Directive or Living Will: Patient states has Advance Directive and will bring in a copy to clinic.    :[id number]    Review of Systems  CONSTITUTIONAL: NEGATIVE for fever, chills, change in weight  INTEGUMENTARY/SKIN: NEGATIVE for worrisome rashes, moles or lesions  EYES: NEGATIVE for vision changes or irritation  ENT/MOUTH: NEGATIVE for ear, mouth and throat problems  RESP: NEGATIVE for significant cough or SOB  BREAST: NEGATIVE for masses, tenderness or discharge  CV: NEGATIVE for chest pain, palpitations or peripheral edema  GI: NEGATIVE for nausea, abdominal pain, heartburn, or change in bowel habits  : NEGATIVE for frequency, dysuria, or hematuria  MUSCULOSKELETAL: NEGATIVE for significant arthralgias or myalgia  NEURO: NEGATIVE for weakness, dizziness or paresthesias  ENDOCRINE: NEGATIVE for  "temperature intolerance, skin/hair changes  HEME: NEGATIVE for bleeding problems  PSYCHIATRIC: NEGATIVE for changes in mood or affect    Patient Active Problem List    Diagnosis Date Noted     Brachial plexopathy 07/24/2018     Incomplete bladder emptying 07/24/2018     History of poliomyelitis 08/23/2017     History of heart artery stent 08/23/2017     Unsteady gait 08/23/2017     Scoliosis 07/27/2015     Seborrheic Keratosis      Osteoarthritis      Hyperlipidemia      Benign Essential Hypertension      Coronary Artery Disease      No past medical history on file.  Past Surgical History:   Procedure Laterality Date     ANKLE FUSION Bilateral Right 2015, Left at age 15     TOTAL KNEE ARTHROPLASTY Right 2009     Current Outpatient Medications   Medication Sig Dispense Refill     aspirin 81 MG EC tablet Take 81 mg by mouth daily.       atorvastatin (LIPITOR) 10 MG tablet Take 1 tablet (10 mg total) by mouth daily. As directed 90 tablet 3     losartan (COZAAR) 50 MG tablet Take 1 tablet (50 mg total) by mouth daily. 90 tablet 3     metoprolol succinate (TOPROL-XL) 50 MG 24 hr tablet Take 1 tablet (50 mg total) by mouth daily. 90 tablet 3     tamsulosin (FLOMAX) 0.4 mg cap Take 1 capsule (0.4 mg total) by mouth Daily after breakfast. 90 capsule 3     No current facility-administered medications for this visit.        Allergies   Allergen Reactions     [place]        Social History     Tobacco Use     Smoking status: Never Smoker     Smokeless tobacco: Never Used   Substance Use Topics     Alcohol use: Yes     Comment: saul      Family History   Problem Relation Age of Onset     Stroke Mother      Lung cancer Father      Harley's esophagus Father      Social History     Substance and Sexual Activity   Drug Use Never           Objective   BP 92/58 (Patient Site: Right Arm, Patient Position: Sitting, Cuff Size: Adult Regular)   Pulse 80   Resp 16   Ht 5' 8\" (1.727 m)   Wt 184 lb 14.4 oz (83.9 kg)   BMI 28.11 " kg/m    Physical Exam    GENERAL APPEARANCE: healthy, alert and no distress     EYES: EOMI,  PERRL     NECK: no adenopathy, no asymmetry, masses, or scars and thyroid normal to palpation     RESP: lungs clear to auscultation - no rales, rhonchi or wheezes     CV: regular rates and rhythm, normal S1 S2, no S3 or S4 and no murmur, click or rub     ABDOMEN:  soft, nontender, no HSM or masses and bowel sounds normal     MS: decreased range of motion in the right elbow.  Gait unsteady     SKIN: no suspicious lesions or rashes     NEURO: Normal strength and tone, sensory exam grossly normal, mentation intact and speech normal     PSYCH: mentation appears normal. and affect normal/bright     LYMPHATICS: No cervical adenopathy    Recent Labs   Lab Test 09/23/19  1445      K 4.4   CREATININE 0.71        PRE-OP Diagnostics:   Labs pending at this time. Results will be reviewed when available.  EKG:   Normal sinus rhythm   Anteroseptal infarct (cited on or before 15-DEC-2005)   Abnormal ECG   When compared with ECG of 15-DEC-2005 19:51,   No significant change was found          Assessment & Plan       The proposed surgical procedure is considered INTERMEDIATE risk.    REVISED CARDIAC RISK INDEX   The patient has the following serious cardiovascular risks for perioperative complications:  Coronary Artery Disease (MI, positive stress test, angina, Qs on EKG) = 1 point    INTERPRETATION: 1 point: Class II (low risk - 0.9% complication rate)      ICD-10-CM    1. Preop general physical exam  Z01.818 Electrocardiogram Perform and Read - Clinic   2. Osteoarthritis of right elbow, unspecified osteoarthritis type  M19.021    3. Loose body in right elbow  M24.021    4. Cubital tunnel syndrome on right  G56.21    5. History of heart artery stent  Z95.5    6. Atherosclerosis of native coronary artery of native heart without angina pectoris  I25.10    7. Benign Essential Hypertension  I10 losartan (COZAAR) 50 MG tablet      metoprolol succinate (TOPROL-XL) 50 MG 24 hr tablet   8. Other hyperlipidemia  E78.49 Comprehensive Metabolic Panel     Lipid Cascade     atorvastatin (LIPITOR) 10 MG tablet   9. History of poliomyelitis  Z86.12    10. Incomplete bladder emptying  R33.9 tamsulosin (FLOMAX) 0.4 mg cap       The patient has the following additional risks and recommendations for perioperative complications:     - No identified additional risk factors other than previously addressed     MEDICATION INSTRUCTIONS:  He is going to hold the losartan and tamsulosin on the morning of surgery.    RECOMMENDATION:  APPROVAL GIVEN to proceed with proposed procedure, without further diagnostic evaluation.    No follow-ups on file.    Signed Electronically by: Silvano Montenegro DO    Copy of this evaluation report is provided to requesting physician.    Preop [facility] Preop Guidelines    Revised Cardiac Risk Index

## 2021-06-11 NOTE — TELEPHONE ENCOUNTER
I received a call from anesthesia after his elbow surgery.  They reported that he had a few runs of an abnormal heart rhythm during surgery that were suspicious for paroxysmal atrial fibrillation.  He is stable now, feeling well and in a normal sinus rhythm.  The referring him back to me for postoperative visit.

## 2021-06-12 NOTE — PROGRESS NOTES
Assessment:     1. Well adult exam  PSA (Prostatic-Specific Antigen), Annual Screen   2. Benign Essential Hypertension  losartan (COZAAR) 50 MG tablet    metoprolol succinate (TOPROL-XL) 50 MG 24 hr tablet   3. Other hyperlipidemia  Comprehensive Metabolic Panel    Lipid Cascade   4. Osteoarthritis     5. Scoliosis     6. Low back pain     7. History of poliomyelitis     8. Urinary frequency  Urinalysis-UC if Indicated    Ambulatory referral to Urology   9. Coronary atherosclerosis     10. History of heart artery stent     11. Unsteady gait         The following high BMI interventions were performed this visit: encouragement to exercise and lifestyle education regarding diet     Plan:      I recommended he return to the clinic to have the above fasting labs checked.  I recommended we check a urinalysis as well and he was given a referral to urology for further workup and evaluation of his urinary frequency symptoms which could be due to an overactive bladder as his prostate is not significantly enlarged on exam today.  He was given refills of his medications.  His blood pressure is currently well controlled.  We discussed strategies to help with low back pain.  I offered a referral to physical therapy if he is interested in the future.     All questions answered.  Await PSA results.  Discussed healthy lifestyle modifications.  Follow up as needed.     Subjective:      Silvano Cruz is a 77 y.o. male who presents for an annual exam. The patient reports that there is not domestic violence in his life.  He denies having concerns regarding hearing, vision, bowel movements or mood.  He has a history of scoliosis, polio and subsequent low back and sciatic pain.  He reports that these pains occasionally wake him up at night and make it difficult to sleep.  He takes Tylenol and occasionally ibuprofen for this.  Occasionally he has radiation into the right thigh.  He also has a history of coronary artery disease and a  subsequent stent placement in 2009.  He denies having any problems with this since that time.  His blood pressure and cholesterol have been adequately controlled.  He is on losartan, metoprolol and a low dose of atorvastatin which he tolerates well.  He describes having arthritis symptoms in various parts of his body, especially in the thumbs.  He attributes this to years of working as a .    He is primarily concerned today about increased frequency and decreased urinary stream.  He denies dysuria or hematuria.  Does not always feel like he empties his bladder completely after urinating.  He is wondering if this could be due to an enlarged prostate.    Social history: , retired .    Healthy Habits:   Regular Exercise: Yes bike and swimming.  Sunscreen Use: Yes  Healthy Diet: Yes  Dental Visits Regularly: Yes  Seat Belt: Yes  Monthly Self Testicular Exams:  No  Hemoccults: No  Flex Sig: No  Colonoscopy: 09/09/2014 recheck 5 years  Lipid Profile: 2014  Glucose Screen: 01/05/2015  Prevention of Osteoporosis: No  Last Dexa: N/A  Guns at Home:  N/A      Immunization History   Administered Date(s) Administered     Influenza, inj, historic 12/15/2006, 10/14/2015     Influenza, seasonal,quad inj 36+ mos 01/05/2015     Pneumo Conj 13-V (2010&after) 01/05/2015     Pneumo Polysac 23-V 09/06/2005     Td, historic 12/06/2005     Tdap 10/17/2011     Immunization status: up to date and documented.    No exam data present    Current Outpatient Prescriptions   Medication Sig Dispense Refill     acetaminophen (TYLENOL EXTRA STRENGTH) 500 MG tablet Take 500 mg by mouth every 4 (four) hours as needed for pain.       aspirin 81 MG EC tablet Take 81 mg by mouth daily.       atorvastatin (LIPITOR) 10 MG tablet TAKE ONE TABLET BY MOUTH EVERY DAY AS DIRECTED 90 tablet 3     camphor-menthol (SARNA ANTI-ITCH) lotion Apply to affected area twice daily as needed. 222 mL 0     losartan (COZAAR) 50 MG tablet TAKE ONE TABLET  "BY MOUTH ONE TIME DAILY 90 tablet 3     metoprolol succinate (TOPROL-XL) 50 MG 24 hr tablet TAKE ONE TABLET BY MOUTH ONE TIME DAILY 90 tablet 3     No current facility-administered medications for this visit.      No past medical history on file.  Past Surgical History:   Procedure Laterality Date     ANKLE FUSION Bilateral Right 2015, Left at age 15     TOTAL KNEE ARTHROPLASTY Right 2009     [place]  Family History   Problem Relation Age of Onset     Stroke Mother      Lung cancer Father      Harley's esophagus Father      Social History     Social History     Marital status:      Spouse name: N/A     Number of children: N/A     Years of education: N/A     Occupational History     Not on file.     Social History Main Topics     Smoking status: Never Smoker     Smokeless tobacco: Never Used     Alcohol use Not on file     Drug use: Not on file     Sexual activity: Not on file     Other Topics Concern     Not on file     Social History Narrative       Review of Systems  General:  Denies problem  Eyes: Denies problem  Ears/Nose/Throat: Denies problem  Cardiovascular: Denies problem  Respiratory:  Denies problem  Gastrointestinal:  Denies problem  Genitourinary: Denies problem  Musculoskeletal:  Denies problem  Skin: Denies problem  Neurologic: Denies problem  Psychiatric: Denies problem  Endocrine: Denies problem  Heme/Lymphatic: Denies problem   Allergic/Immunologic: Denies problem        Objective:     Vitals:    08/23/17 1527   BP: 110/72   Pulse: 72   Resp: 16   Temp: 99  F (37.2  C)   TempSrc: Oral   Weight: 183 lb 3 oz (83.1 kg)   Height: 5' 7.5\" (1.715 m)     Body mass index is 28.27 kg/(m^2).    Physical  General Appearance: Alert, cooperative, no distress, appears stated age  Head: Normocephalic, without obvious abnormality, atraumatic  Eyes: PERRL, conjunctiva/corneas clear, EOM's intact  Ears: Normal TM's and external ear canals, both ears  Nose: Nares normal, septum midline,mucosa normal, no " drainage  Throat: Lips, mucosa, and tongue normal; teeth and gums normal  Neck: Supple, symmetrical, trachea midline, no adenopathy;  thyroid: not enlarged, symmetric, no tenderness/mass/nodules  Back: Symmetric, no curvature, ROM normal, no CVA tenderness  Lungs: Clear to auscultation bilaterally, respirations unlabored  Heart: Regular rate and rhythm, S1 and S2 normal, no murmur, rub, or gallop,  Abdomen: Soft, non-tender, bowel sounds active all four quadrants,  no masses, no organomegaly  Genitourinary: Penis normal. Right testis is descended. Left testis is descended. No hernias palpated  Rectal: Prostate is smooth, nontender and slightly enlarged.   Musculoskeletal: Normal range of motion. No joint swelling or deformity.   Extremities: Extremities normal, atraumatic, no cyanosis or edema  Skin: Skin color, texture, turgor normal, no rashes or lesions  Lymph nodes: Cervical, supraclavicular, and axillary nodes normal  Neurologic: He is alert.  Normal speech.  No focal deficits.  Normal deep tendon reflexes.   Psychiatric: He has a normal mood and affect.        No results found for this or any previous visit (from the past 168 hour(s)).

## 2021-06-14 NOTE — PROGRESS NOTES
Assessment / Impression     1. Right shoulder pain  XR Shoulder Right 2 or More VWS    MR Shoulder Without Contrast Right    Ambulatory referral to Orthopedic Surgery   2. Weakness of right arm     3. History of poliomyelitis           Plan:     I recommended that we check an x-ray of the right shoulder.  I personally reviewed the images with him and provided an initial interpretation which showed no acute abnormalities.  The formal radiology report is pending.  Given the pain, decreased range of motion and significant weakness he has with external rotation and Scaption strength testing against resistance I recommended we pursue an MRI of the right shoulder.  He agrees with the plan.  I also referred him to see an orthopedic specialist for this.  He may take Tylenol as needed for pain.    Subjective:      HPI: Silvano Cruz is a 77 y.o. male who resents to the clinic to be evaluated for acute onset of severe right-sided shoulder pain started 1 week ago.  He reports that the symptoms came out of the blue and affect the right lateral shoulder and right upper arm.  He reports that this area feels numb and a little itchy.  He denies any known injury that may have caused this.  He has a history of poliomyelitis and occasionally uses an electric scooter which he loads in and out of his car.  He states that it is possible that this activity may have caused a strain in his shoulder, but he does not remember injuring it.  He denies having history of fractures, surgery or dislocations that involve the right shoulder.        Review of Systems  All other systems reviewed and are negative.     History   Smoking Status     Never Smoker   Smokeless Tobacco     Never Used       Family History   Problem Relation Age of Onset     Stroke Mother      Lung cancer Father      Harley's esophagus Father        Objective:     /56 (Patient Site: Left Arm, Patient Position: Sitting, Cuff Size: Adult Large)  Pulse 84  Temp 97.8  F  "(36.6  C) (Oral)   Resp 16  Ht 5' 7.5\" (1.715 m)  Wt 184 lb 5 oz (83.6 kg)  BMI 28.44 kg/m2  Physical Examination: General appearance - alert, well appearing, and in no distress  Neurological: alert, oriented, normal speech, no focal findings or movement disorder noted.    Extremities: No edema, no clubbing or cyanosis.  Unsteady gait.  Decreased active and passive range of motion on the right shoulder. Scaption strength and external rotation strength significantly diminished on the right.  Hamm testing causes mild discomfort on right.  Psychiatric: Normal affect. Does not appear anxious or depressed.     No results found for this or any previous visit (from the past 168 hour(s)).    Current Outpatient Prescriptions   Medication Sig Note     acetaminophen (TYLENOL EXTRA STRENGTH) 500 MG tablet Take 500 mg by mouth every 4 (four) hours as needed for pain.      aspirin 81 MG EC tablet Take 81 mg by mouth daily.      atorvastatin (LIPITOR) 10 MG tablet TAKE ONE TABLET BY MOUTH EVERY DAY AS DIRECTED      losartan (COZAAR) 50 MG tablet TAKE ONE TABLET BY MOUTH ONE TIME DAILY      metoprolol succinate (TOPROL-XL) 50 MG 24 hr tablet TAKE ONE TABLET BY MOUTH ONE TIME DAILY      tamsulosin (FLOMAX) 0.4 mg Cp24 daily. 12/11/2017: Received from: External Pharmacy Received Sig: TAKE ONE CAPSULE BY MOUTH EVERY DAY     camphor-menthol (SARNA ANTI-ITCH) lotion Apply to affected area twice daily as needed.      "

## 2021-06-14 NOTE — PROGRESS NOTES
I spoke to Silvano about his MRI results.  He is struggling with significant pain which makes it difficult for him to sleep.  He has appointment to see an orthopedic surgeon at the end of this week.  I sent a prescription for Vicodin that he may use as needed for breakthrough pain.

## 2021-06-17 NOTE — PATIENT INSTRUCTIONS - HE
Patient Instructions by Silvano Naranjo DO at 9/23/2019  1:40 PM     Author: Silvano Naranjo DO Service: -- Author Type: Physician    Filed: 9/23/2019  2:06 PM Encounter Date: 9/23/2019 Status: Signed    : Silvano Naranjo DO (Physician)         Patient Education     Exercise for a Healthier Heart  You may wonder how you can improve the health of your heart. If youre thinking about exercise, youre on the right track. You dont need to become an athlete, but you do need a certain amount of brisk exercise to help strengthen your heart. If you have been diagnosed with a heart condition, your doctor may recommend exercise to help stabilize your condition. To help make exercise a habit, choose safe, fun activities.       Be sure to check with your health care provider before starting an exercise program.    Why exercise?  Exercising regularly offers many healthy rewards. It can help you do all of the following:    Improve your blood cholesterol levels to help prevent further heart trouble    Lower your blood pressure to help prevent a stroke or heart attack    Control diabetes, or reduce your risk of getting this disease    Improve your heart and lung function    Reach and maintain a healthy weight    Make your muscles stronger and more limber so you can stay active    Prevent falls and fractures by slowing the loss of bone mass (osteoporosis)    Manage stress better  Exercise tips  Ease into your routine. Set small goals. Then build on them.  Exercise on most days. Aim for a total of 150 or more minutes of moderate to  vigorous intensity activity each week. Consider 40 minutes, 3 to 4 times a week. For best results, activity should last for 40 minutes on average. It is OK to work up to the 40 minute period over time. Examples of moderate-intensity activity is walking one mile in 15 minutes or 30 to 45 minutes of yard work.  Step up your daily activity level. Along with your  exercise program, try being more active throughout the day. Walk instead of drive. Do more household tasks or yard work.  Choose one or more activities you enjoy. Walking is one of the easiest things you can do. You can also try swimming, riding a bike, or taking an exercise class.  Stop exercising and call your doctor if you:    Have chest pain or feel dizzy or lightheaded    Feel burning, tightness, pressure, or heaviness in your chest, neck, shoulders, back, or arms    Have unusual shortness of breath    Have increased joint or muscle pain    Have palpitations or an irregular heartbeat      [id number] [employer]. [address] [place] [address] 43682. All rights reserved. This information is not intended as a substitute for professional medical care. Always follow your healthcare professional's instructions.         Patient Education   Instrumental Activities of Daily Living  Your Health Risk Assessment indicates you have difficulties with instrumental activities of daily living which include laundry, housekeeping, banking, shopping, using the telephone, food preparation, transportation, or taking your own medications. Please make a follow up appointment for us to address this issue in more detail.    [employer] has resources available on the following website: [email or web address]     Also, here is a local agency that provides help with meals and other assistance:   [facility] Line: [phone]     Patient Education   Understanding Advance Care Planning  Advance care planning is the process of deciding ones own future medical care. It helps ensure that if you cant speak for yourself, your wishes can still be carried out. The plan is a series of legal documents that note a persons wishes. The documents vary by state. Advance care planning may be done when a person has a serious illness that is expected to get worse. It may be done before major surgery. And it can help  you and your family be prepared in case of a major illness or injury. Advance care planning helps with making decisions at these times.       A health care proxy is a person who acts as the voice of a patient when the patient cant speak for himself or herself. The name of this role varies by state. It may be called a Durable Medical Power of  or Durable Power of  for Healthcare. It may be called an agent, surrogate, or advocate. Or it may be called a representative or decision maker. It is an official duty that is identified by a legal document. The document also varies by state.    Why Is Advance Care Planning Important?  If a person communicates their healthcare wishes:    They will be given medical care that matches their values and goals.    Their family members will not be forced to make decisions in a crisis with no guidance.  Creating a Plan  Making an advance care plan is often done in 3 steps:    Thinking about ones wishes. To create an advance care plan, you should think about what kind of medical treatment you would want if you lose the ability to communicate. Are there any situations in which you would refuse or stop treatment? Are there therapies you would want or not want? And whom do you want to make decisions for you? There are many places to learn more about how to plan for your care. Ask your doctor or  for resources.    Picking a health care proxy. This means choosing a trusted person to speak for you only when you cant speak for yourself. When you cannot make medical decisions, your proxy makes sure the instructions in your advance care plan are followed. A proxy does not make decisions based on his or her own opinions. They must put aside those opinions and values if needed, and carry out your wishes.    Filling out the legal documents. There are several kinds of legal documents for advance care planning. Each one tells health care providers your wishes. The  documents may vary by state. They must be signed and may need to be witnessed or notarized. You can cancel or change them whenever you wish. Depending on your state, the documents may include a Healthcare Proxy form, Living Will, Durable Medical Power of , Advance Directive, or others.  The Familys Role  The best help a family can give is to support their loved ones wishes. Open and honest communication is vital. Family should express any concerns they have about the patients choices while the patient can still make decisions.    0243-4637 [employer]. [address] [place] [address]38. All rights reserved. This information is not intended as a substitute for professional medical care. Always follow your healthcare professional's instructions.         Also, [facility] offers a free, downloadable health care directive that allows you to share your treatment choices and personal preferences if you cannot communicate your wishes. It also allows you to appoint another person (called a health care agent) to make health care decisions if you are unable to do so. You can download an advance directive by going here: [email or web address]     Patient Education   Personalized Prevention Plan  You are due for the preventive services outlined below.  Your care team is available to assist you in scheduling these services.  If you have already completed any of these items, please share that information with your care team to update in your medical record.  Health Maintenance   Topic Date Due   ? ZOSTER VACCINES (1 of 2) 07/01/1992   ? PNEUMOCOCCAL IMMUNIZATION 65+ LOW/MEDIUM RISK (1 of 2 - PCV13) 07/01/2007   ? MEDICARE ANNUAL WELLNESS VISIT  08/23/2018   ? INFLUENZA VACCINE RULE BASED (1) 08/01/2019   ? FALL RISK ASSESSMENT  09/23/2020   ? ADVANCE CARE PLANNING  05/25/2021   ? TD 18+ HE  10/17/2021

## 2021-06-19 NOTE — PROGRESS NOTES
Preoperative Exam    Scheduled Procedure: Cataract  Surgery Date:  RT 07/31/18 and LT 08/21/18  Surgery Location: [facility] in [place]  [phone]  Surgeon:  Dr. Correia    Assessment/Plan:     1. Encounter for preoperative examination for general surgical procedure  He is cleared to proceed with cataract surgery as scheduled.    2. Bilateral cataracts    3. Benign Essential Hypertension  Well-controlled on metoprolol XL 50 mg daily and losartan 50 mg daily.    4. Other hyperlipidemia  Continue atorvastatin 10 mg daily  - Comprehensive Metabolic Panel; Future  - Lipid Profile; Future    5. Coronary atherosclerosis  Continue atorvastatin, metoprolol, losartan and baby aspirin.    6. History of heart artery stent  Continue atorvastatin, metoprolol, losartan and baby aspirin.    7. Brachial plexopathy  Slowly improving.  He may follow-up with neurology as needed for this.    8. History of poliomyelitis  Stable.  He uses poles/canes to assist with ambulation.    9. Incomplete bladder emptying  Continue Flomax as prescribed through urology.    Surgical Procedure Risk: Low (reported cardiac risk generally < 1%)  Have you had prior anesthesia?: Yes  Have you or any family members had a previous anesthesia reaction:  No  Do you or any family members have a history of a clotting or bleeding disorder?: No  Cardiac Risk Assessment: no increased risk for major cardiac complications    Patient approved for surgery with general or local anesthesia.        Functional Status: Independent  Patient plans to recover at home with family.     Subjective:      Silvano Cruz is a 78 y.o. male who presents for a preoperative consultation.  He has noticed worsening foggy vision with floaters over the past 2 years.  He is scheduled to have cataract surgery in the right eye on 7/31 in the left eye on 8/21. He has a history of coronary artery disease and a subsequent stent placement in 2009, retention, hyperlipidemia, incomplete bladder  emptying, right upper extremity brachial plexopathy, scoliosis and polio.  He is on losartan, metoprolol and low dose of atorvastatin which he tolerates well.  He has been seeing neurology for the brachioplexopathy symptoms which started this past December and are slowly improving.  On 8/25/17 he had a normal CMP, LDL 50, PSA 1.       All other systems reviewed and are negative, other than those listed in the HPI.    Pertinent History  Do you have difficulty breathing or chest pain after walking up a flight of stairs: No  History of obstructive sleep apnea: No  Steroid use in the last 6 months: No  Frequent Aspirin/NSAID use: No  Prior Blood Transfusion: No  Prior Blood Transfusion Reaction: No  If for some reason prior to, during or after the procedure, if it is medically indicated, would you be willing to have a blood transfusion?:  There is no transfusion refusal.    Current Outpatient Prescriptions   Medication Sig Dispense Refill     acetaminophen (TYLENOL EXTRA STRENGTH) 500 MG tablet Take 500 mg by mouth every 4 (four) hours as needed for pain.       aspirin 81 MG EC tablet Take 81 mg by mouth daily.       atorvastatin (LIPITOR) 10 MG tablet TAKE ONE TABLET BY MOUTH EVERY DAY AS DIRECTED 90 tablet 3     gatifloxacin (ZYMAXID) 0.5 % Drop ophthalmic solution        ketorolac (ACULAR) 0.5 % ophthalmic solution        losartan (COZAAR) 50 MG tablet TAKE ONE TABLET BY MOUTH ONE TIME DAILY 90 tablet 3     metoprolol succinate (TOPROL-XL) 50 MG 24 hr tablet TAKE ONE TABLET BY MOUTH ONE TIME DAILY 90 tablet 3     prednisoLONE acetate (PRED-FORTE) 1 % ophthalmic suspension        tamsulosin (FLOMAX) 0.4 mg Cp24 daily.  5     camphor-menthol (SARNA ANTI-ITCH) lotion Apply to affected area twice daily as needed. 222 mL 0     HYDROcodone-acetaminophen 5-325 mg per tablet Take 1 tablet by mouth every 6 (six) hours as needed for pain. 20 tablet 0     No current facility-administered medications for this visit.      "    Allergies   Allergen Reactions     Strawberry        Patient Active Problem List   Diagnosis     Seborrheic Keratosis     Osteoarthritis     Hyperlipidemia     Benign Essential Hypertension     Coronary Artery Disease     Scoliosis     History of poliomyelitis     History of heart artery stent     Unsteady gait       No past medical history on file.    Past Surgical History:   Procedure Laterality Date     ANKLE FUSION Bilateral Right 2015, Left at age 15     TOTAL KNEE ARTHROPLASTY Right 2009       Social History     Social History     Marital status:      Spouse name: N/A     Number of children: N/A     Years of education: N/A     Occupational History     Not on file.     Social History Main Topics     Smoking status: Never Smoker     Smokeless tobacco: Never Used     Alcohol use Not on file     Drug use: Not on file     Sexual activity: Not on file     Other Topics Concern     Not on file     Social History Narrative             Objective:     Vitals:    07/24/18 1615   BP: 110/64   Pulse: 68   Resp: 16   Temp: 97.9  F (36.6  C)   TempSrc: Oral   Weight: 184 lb (83.5 kg)   Height: 5' 7.5\" (1.715 m)         Physical Exam:  General Appearance: Alert, cooperative, no distress, appears stated age  Head: Normocephalic, without obvious abnormality, atraumatic  Eyes: PERRL, conjunctiva/corneas clear, EOM's intact  Ears: Normal TM's and external ear canals, both ears  Nose: Nares normal, septum midline,mucosa normal, no drainage  Throat: Lips, mucosa, and tongue normal; teeth and gums normal  Neck: Supple, symmetrical, trachea midline, no adenopathy;  thyroid: not enlarged, symmetric, no tenderness/mass/nodules  Back:  no CVA tenderness  Lungs: Clear to auscultation bilaterally, respirations unlabored  Heart: Regular rate and rhythm, S1 and S2 normal, no murmur, rub, or gallop,  Abdomen: Soft, non-tender, bowel sounds active all four quadrants,  no masses, no organomegaly   Extremities: Unsteady gait.  " Decreased range of motion in the right upper extremity.  Skin: Skin color, texture, turgor normal, no rashes or lesions  Lymph nodes: Cervical, supraclavicular, and axillary nodes normal  Neurologic: He is alert.  Normal speech.  No focal deficits.  Normal deep tendon reflexes.   Psychiatric: He has a normal mood and affect.       There are no Patient Instructions on file for this visit.        Labs:  No labs were ordered during this visit    Immunization History   Administered Date(s) Administered     Influenza high dose, seasonal 08/29/2017     Influenza, inj, historic,unspecified 12/15/2006, 10/14/2015     Influenza, seasonal,quad inj 36+ mos 01/05/2015     Pneumo Conj 13-V (2010&after) 01/05/2015     Pneumo Polysac 23-V 09/06/2005, 08/29/2017     Td,adult,historic,unspecified 12/06/2005     Tdap 10/17/2011           Electronically signed by Silvano Montenegro DO 07/24/18 4:18 PM

## 2021-07-03 NOTE — ADDENDUM NOTE
Addendum Note by Shweta Driscoll CMA at 9/23/2019  1:40 PM     Author: Shweta Driscoll CMA Service: -- Author Type: Certified Medical Assistant    Filed: 9/23/2019  2:38 PM Encounter Date: 9/23/2019 Status: Signed    : Shweta Driscoll CMA (Certified Medical Assistant)    Addended by: SHWETA DRISCOLL on: 9/23/2019 02:38 PM        Modules accepted: Orders

## 2021-08-27 DIAGNOSIS — I10 ESSENTIAL HYPERTENSION, BENIGN: Primary | ICD-10-CM

## 2021-08-27 DIAGNOSIS — E78.49 OTHER HYPERLIPIDEMIA: ICD-10-CM

## 2021-08-31 DIAGNOSIS — I10 ESSENTIAL HYPERTENSION, BENIGN: ICD-10-CM

## 2021-08-31 DIAGNOSIS — E78.49 OTHER HYPERLIPIDEMIA: ICD-10-CM

## 2021-08-31 RX ORDER — LOSARTAN POTASSIUM 50 MG/1
50 TABLET ORAL DAILY
Qty: 30 TABLET | Refills: 0 | Status: SHIPPED | OUTPATIENT
Start: 2021-08-31 | End: 2021-09-01

## 2021-08-31 RX ORDER — ATORVASTATIN CALCIUM 10 MG/1
10 TABLET, FILM COATED ORAL DAILY
Qty: 30 TABLET | Refills: 0 | Status: SHIPPED | OUTPATIENT
Start: 2021-08-31 | End: 2021-09-01

## 2021-08-31 RX ORDER — METOPROLOL SUCCINATE 50 MG/1
50 TABLET, EXTENDED RELEASE ORAL DAILY
Qty: 30 TABLET | Refills: 0 | Status: SHIPPED | OUTPATIENT
Start: 2021-08-31 | End: 2021-09-01

## 2021-08-31 NOTE — TELEPHONE ENCOUNTER
DE,    Refill request for Metoprolol, Atorvastatin, Losartan  Listed as [facility] patient.  Last seen 8/2020 for preop    Thanks,  Alina Barber RN

## 2021-08-31 NOTE — TELEPHONE ENCOUNTER
Are you willing to refill meds until Est. Care appt with you?     Refill Request: Atorvastatin- Losartan- Metoprolol  Last filled: 8.20.20 disp 90 refills 3  Last visit: 8.20.20     Benign Essential Hypertension  I10 losartan (COZAAR) 50 MG tablet       metoprolol succinate (TOPROL-XL) 50 MG 24 hr tablet   8. Other hyperlipidemia  E78.49 Comprehensive Metabolic Panel       Lipid Cascade       atorvastatin (LIPITOR) 10 MG tablet     Last BP: 8.20.20 92/58   Last lipids: 8.20.20   Cholesterol <=199 mg/dL 116      Triglycerides <=149 mg/dL 99     Direct Measure HDL >=40 mg/dL 43     LDL Cholesterol Calculated <=129 mg/dL 53     Patient Fasting > 8hrs?  Yes      Next [place]ill[place]: 10.18.2021

## 2021-08-31 NOTE — TELEPHONE ENCOUNTER
Patient transferred from Central Scheduling.    Scheduled appt to Establish Care with Dr. Kendall on 10/18/21     Needs refill of the following medications to last him until his appointment.    Losartan 50mg  Atorvastatin 10mg  Metoprolol 50mg    Preferred pharmacy is [facility] in [place], [place]

## 2021-09-01 RX ORDER — ATORVASTATIN CALCIUM 10 MG/1
10 TABLET, FILM COATED ORAL DAILY
Qty: 60 TABLET | Refills: 0 | Status: SHIPPED | OUTPATIENT
Start: 2021-09-01 | End: 2021-10-18

## 2021-09-01 RX ORDER — LOSARTAN POTASSIUM 50 MG/1
50 TABLET ORAL DAILY
Qty: 60 TABLET | Refills: 0 | Status: SHIPPED | OUTPATIENT
Start: 2021-09-01 | End: 2021-09-29

## 2021-09-01 RX ORDER — METOPROLOL SUCCINATE 50 MG/1
50 TABLET, EXTENDED RELEASE ORAL DAILY
Qty: 60 TABLET | Refills: 0 | Status: SHIPPED | OUTPATIENT
Start: 2021-09-01 | End: 2021-10-18

## 2021-09-08 ENCOUNTER — TRANSFERRED RECORDS (OUTPATIENT)
Dept: HEALTH INFORMATION MANAGEMENT | Facility: CLINIC | Age: 81
End: 2021-09-08

## 2021-09-14 ENCOUNTER — TELEPHONE (OUTPATIENT)
Dept: OPHTHALMOLOGY | Facility: CLINIC | Age: 81
End: 2021-09-14

## 2021-09-14 NOTE — TELEPHONE ENCOUNTER
Dr. Melissa Luna called and wanted to schedule patient for Vitreolysis.  I could not reach patient.  Triage contact number given to patient.

## 2021-09-15 NOTE — TELEPHONE ENCOUNTER
Scheduled October 7th at 0730 in 30 minute time slot consult for vitreolysis with Dr. Serrano    lonstanding bothersome floaters referred by pt's eye provider    Pt aware of date/time/location/duration at [facility]    Masood Rosenberg RN 12:50 PM 09/16/21            [phone] home-- left message with direct number at [phone]    Masood Rosenberg RN 8:40 AM 09/15/21    Cell: [phone] left message with direct number at [phone]  Masood Rosenberg RN 8:41 AM 09/15/21

## 2021-09-16 DIAGNOSIS — R33.9 INCOMPLETE BLADDER EMPTYING: Primary | ICD-10-CM

## 2021-09-16 RX ORDER — TAMSULOSIN HYDROCHLORIDE 0.4 MG/1
0.4 CAPSULE ORAL
COMMUNITY
Start: 2020-08-20 | End: 2021-09-16

## 2021-09-16 RX ORDER — TAMSULOSIN HYDROCHLORIDE 0.4 MG/1
0.4 CAPSULE ORAL DAILY
Qty: 30 CAPSULE | Refills: 0 | Status: SHIPPED | OUTPATIENT
Start: 2021-09-16 | End: 2021-10-18

## 2021-09-16 NOTE — TELEPHONE ENCOUNTER
Last fill 8/20/2020    Last seen 8/20/20    Does have upcoming appointment with you on 10/18/21. Ok to bridge patient until appointment?

## 2021-09-16 NOTE — TELEPHONE ENCOUNTER
Pt has Establish Care appt scheduled with Dr. Kendall for 10/18/21    He will run out of his Tamsulosin     Requesting refill to get his to his appt.    [facility] in [place] is the preferred pharmacy.

## 2021-09-28 DIAGNOSIS — I10 ESSENTIAL HYPERTENSION, BENIGN: ICD-10-CM

## 2021-09-29 RX ORDER — LOSARTAN POTASSIUM 50 MG/1
50 TABLET ORAL DAILY
Qty: 30 TABLET | Refills: 0 | Status: SHIPPED | OUTPATIENT
Start: 2021-09-29 | End: 2021-10-18

## 2021-09-29 NOTE — TELEPHONE ENCOUNTER
Losartan:    One month supply sent 9/1/21  Due for OV/physical.  Last 8/20/2020 for preop    Patient has future appointment on 10/18 for preventive care with new provider    Refill sent for #30.  Alina Barber RN

## 2021-10-03 ENCOUNTER — HEALTH MAINTENANCE LETTER (OUTPATIENT)
Age: 81
End: 2021-10-03

## 2021-10-07 ENCOUNTER — OFFICE VISIT (OUTPATIENT)
Dept: OPHTHALMOLOGY | Facility: CLINIC | Age: 81
End: 2021-10-07
Attending: OPHTHALMOLOGY
Payer: COMMERCIAL

## 2021-10-07 DIAGNOSIS — H43.393 VITREOUS SYNERESIS OF BOTH EYES: ICD-10-CM

## 2021-10-07 DIAGNOSIS — H43.813 PVD (POSTERIOR VITREOUS DETACHMENT), BOTH EYES: Primary | ICD-10-CM

## 2021-10-07 PROCEDURE — 67031 LASER SURGERY EYE STRANDS: CPT | Performed by: OPHTHALMOLOGY

## 2021-10-07 PROCEDURE — G0463 HOSPITAL OUTPT CLINIC VISIT: HCPCS | Mod: 25

## 2021-10-07 PROCEDURE — 99204 OFFICE O/P NEW MOD 45 MIN: CPT | Mod: 57 | Performed by: OPHTHALMOLOGY

## 2021-10-07 ASSESSMENT — REFRACTION_MANIFEST
OS_CYLINDER: SPHERE
OD_SPHERE: -1.25
OS_SPHERE: -1.25
OD_AXIS: 005
OD_CYLINDER: +0.75

## 2021-10-07 ASSESSMENT — VISUAL ACUITY
OD_SC: 20/70
OS_SC+: -2
METHOD: SNELLEN - LINEAR
OD_PH_SC: 20/25
OS_PH_SC: 20/30
OS_PH_SC+: -2
OS_SC: 20/70
OD_SC+: -1

## 2021-10-07 ASSESSMENT — EXTERNAL EXAM - LEFT EYE: OS_EXAM: NORMAL

## 2021-10-07 ASSESSMENT — TONOMETRY
OD_IOP_MMHG: 09
OS_IOP_MMHG: 10
IOP_METHOD: TONOPEN

## 2021-10-07 ASSESSMENT — SLIT LAMP EXAM - LIDS
COMMENTS: NORMAL
COMMENTS: NORMAL

## 2021-10-07 ASSESSMENT — CUP TO DISC RATIO
OD_RATIO: 0.05
OS_RATIO: 0.1

## 2021-10-07 ASSESSMENT — CONF VISUAL FIELD
OD_NORMAL: 1
OS_NORMAL: 1
METHOD: COUNTING FINGERS

## 2021-10-07 ASSESSMENT — EXTERNAL EXAM - RIGHT EYE: OD_EXAM: NORMAL

## 2021-10-07 NOTE — LETTER
10/7/2021       RE: Silvano Cruz  [address] [place] [address]     Dear Dr. Ellis,    Thank you for referring your patient, Silvano Cruz, to the [facility] at [facility]. Please see a copy of my visit note below.    Chief Complaint(s) and History of Present Illness(es)     Retinal Evaluation     Laterality: both eyes    Course: gradually worsening    Associated symptoms: floaters.  Negative for eye pain, headache and   flashes    Treatments tried: no treatments    Pain scale: 0/10    Comments: vitreolysis consult              Comments     C/o floater right eye>left eye states worse since his cataract surgery   both eyes about a year ago  States no Flashes,pain or headaches    Danuta Serrano COT 7:42 AM October 7, 2021                 Review of systems for the eyes was negative other than the pertinent positives/negatives listed in the HPI.      Assessment & Plan      Silvano Cruz is a 81 year old male with the following diagnoses:   1. PVD (posterior vitreous detachment), both eyes    2. Vitreous syneresis of both eyes         Referred by Dr. Melissa Ellis for YAG vitreolysis. Worse in right compared to left and has been present for over a year  Good candidate for vitreolysis  R/B/A discussed. Consent obtained. Plan for YAG vitreolysis Right eye today   Consider left eye pending outcome  Return precautions reviewed       Patient disposition:   Return in about 4 weeks (around 11/4/2021) for Follow Up, DFE, possible YAG vitreolysis.    More Archuleta MD  Ophthalmology Resident, PGY-4         Attending Physician Attestation:  Complete documentation of historical and exam elements from today's encounter can be found in the full encounter summary report (not reduplicated in this progress note).  I personally obtained the chief complaint(s) and history of present illness.  I confirmed and edited as necessary the review of systems, past  medical/surgical history, family history, social history, and examination findings as documented by others; and I examined the patient myself.  I personally reviewed the relevant tests, images, and reports as documented above.  I formulated and edited as necessary the assessment and plan and discussed the findings and management plan with the patient and family. Today with Silvano Cruz, I reviewed the indications, risks, benefits, and alternatives of the proposed surgical procedure including, but not limited to, failure obtain the desired result  and need for additional surgery, bleeding, infection, loss of vision, loss of the eye.I provided multiple opportunities for the questions, answered all questions to the best of my ability, and confirmed that my answers and my discussion were understood.      Attending Physician Procedure Attestation: I was present for the entire procedure. - Miguel Serrano MD         Again, thank you for allowing me to participate in the care of your patient.      Sincerely,      Miguel Serrano MD  , Comprehensive Ophthalmology  Department of Ophthalmology and Visual Neurosciences  [facility]

## 2021-10-07 NOTE — PROGRESS NOTES
Chief Complaint(s) and History of Present Illness(es)     Retinal Evaluation     Laterality: both eyes    Course: gradually worsening    Associated symptoms: floaters.  Negative for eye pain, headache and   flashes    Treatments tried: no treatments    Pain scale: 0/10    Comments: vitreolysis consult              Comments     C/o floater right eye>left eye states worse since his cataract surgery   both eyes about a year ago  States no Flashes,pain or headaches    Danuta Serrano COT 7:42 AM October 7, 2021                 Review of systems for the eyes was negative other than the pertinent positives/negatives listed in the HPI.      Assessment & Plan      Silvano Cruz is a 81 year old male with the following diagnoses:   1. PVD (posterior vitreous detachment), both eyes    2. Vitreous syneresis of both eyes         Referred by Dr. Melissa Ellis for YAG vitreolysis. Worse in right compared to left and has been present for over a year  Good candidate for vitreolysis  R/B/A discussed. Consent obtained. Plan for YAG vitreolysis Right eye today   Consider left eye pending outcome  Return precautions reviewed       Patient disposition:   Return in about 4 weeks (around 11/4/2021) for Follow Up, DFE, possible YAG vitreolysis.    More Archuleta MD  Ophthalmology Resident, PGY-4         Attending Physician Attestation:  Complete documentation of historical and exam elements from today's encounter can be found in the full encounter summary report (not reduplicated in this progress note).  I personally obtained the chief complaint(s) and history of present illness.  I confirmed and edited as necessary the review of systems, past medical/surgical history, family history, social history, and examination findings as documented by others; and I examined the patient myself.  I personally reviewed the relevant tests, images, and reports as documented above.  I formulated and edited as necessary the assessment and plan and discussed the  findings and management plan with the patient and family. Today with Silvano Cruz, I reviewed the indications, risks, benefits, and alternatives of the proposed surgical procedure including, but not limited to, failure obtain the desired result  and need for additional surgery, bleeding, infection, loss of vision, loss of the eye.I provided multiple opportunities for the questions, answered all questions to the best of my ability, and confirmed that my answers and my discussion were understood.      Attending Physician Procedure Attestation: I was present for the entire procedure. - Miguel Serrano MD

## 2021-10-07 NOTE — NURSING NOTE
Chief Complaints and History of Present Illnesses   Patient presents with     Retinal Evaluation     vitreolysis consult     Chief Complaint(s) and History of Present Illness(es)     Retinal Evaluation     Laterality: both eyes    Course: gradually worsening    Associated symptoms: floaters.  Negative for eye pain, headache and flashes    Treatments tried: no treatments    Pain scale: 0/10    Comments: vitreolysis consult              Comments     C/o floater right eye>left eye states worse since his cataract surgery both eyes about a year ago  States no Flashes,pain or headaches    Danuta Serrano COT 7:42 AM October 7, 2021

## 2021-10-15 ASSESSMENT — ACTIVITIES OF DAILY LIVING (ADL)
CURRENT_FUNCTION: SHOPPING REQUIRES ASSISTANCE
CURRENT_FUNCTION: LAUNDRY REQUIRES ASSISTANCE

## 2021-10-15 NOTE — PROGRESS NOTES
"   SUBJECTIVE:   CC: Silvano Cruz is an 81 year old woman who presents for preventive health visit.       Patient has been advised of split billing requirements and indicates understanding: Yes     Healthy Habits:     In general, how would you rate your overall health?  Fair    Frequency of exercise:  1 day/week    Duration of exercise:  Less than 15 minutes    Do you usually eat at least 4 servings of fruit and vegetables a day, include whole grains    & fiber and avoid regularly eating high fat or \"junk\" foods?  Yes    Taking medications regularly:  Yes    Medication side effects:  Not applicable    Ability to successfully perform activities of daily living:  Shopping requires assistance and laundry requires assistance    Home Safety:  No safety concerns identified    Hearing Impairment:  No hearing concerns    In the past 6 months, have you been bothered by leaking of urine?  No    In general, how would you rate your overall mental or emotional health?  Excellent      PHQ-2 Total Score: 0    Additional concerns today:  Yes    Acute concerns: med refills. Used to see Dr. Jennifer castro he moved clinic.         Today's PHQ-2 Score:   PHQ-2 ( 1999 [employer]) 10/18/2021   Q1: Little interest or pleasure in doing things 0   Q2: Feeling down, depressed or hopeless 0   PHQ-2 Score 0   Q1: Little interest or pleasure in doing things Not at all   Q2: Feeling down, depressed or hopeless Not at all   PHQ-2 Score 0       Abuse: Current or Past (Physical, Sexual or Emotional) - No  Do you feel safe in your environment? Yes    Have you ever done Advance Care Planning? (For example, a Health Directive, POLST, or a discussion with a medical provider or your loved ones about your wishes): Yes, patient states has an Advance Care Planning document and will bring a copy to the clinic.    Social History     Tobacco Use     Smoking status: Never Smoker     Smokeless tobacco: Never Used   Substance Use Topics     Alcohol use: Yes     " Comment: Alcoholic Drinks/day: alittle     If you drink alcohol do you typically have >3 drinks per day or >7 drinks per week? No    Alcohol Use 10/18/2021   Prescreen: >3 drinks/day or >7 drinks/week? No   Reviewed orders with patient.  Reviewed health maintenance and updated orders accordingly - Yes  Lab work is in process  Labs reviewed in EPIC  BP Readings from Last 3 Encounters:   10/18/21 100/56   08/20/20 92/58   09/23/19 116/70    Wt Readings from Last 3 Encounters:   10/18/21 84 kg (185 lb 3.2 oz)   08/20/20 83.9 kg (184 lb 14.4 oz)   09/23/19 84 kg (185 lb 2 oz)                  Patient Active Problem List   Diagnosis     Pain in joint involving ankle and foot     Impaired gait     Benign essential hypertension     Brachial plexopathy     Coronary atherosclerosis     History of heart artery stent     History of poliomyelitis     Hyperlipidemia     Incomplete bladder emptying     Osteoarthrosis     Scoliosis     Seborrheic keratosis     Walking difficulty due to multiple sites     Squamous cell carcinoma of skin of face     Past Surgical History:   Procedure Laterality Date     ARTHRODESIS ANKLE Right 1/14/2015    Procedure: ARTHRODESIS ANKLE;  Surgeon: Sundar Chavez MD;  [facility]     ARTHRODESIS ANKLE Bilateral Right 2015, Left at age 15     ARTHROPLASTY KNEE      Right     CARDIAC SURGERY      LAD stent     CATARACT IOL, RT/LT       ENT SURGERY      tonsillectomy 9 yo     IR LUMBAR EPIDURAL STEROID INJECTION  11/3/2009     IR LUMBAR EPIDURAL STEROID INJECTION  3/9/2010     ORTHOPEDIC SURGERY      left foot arthrodesis when 16 yo     ORTHOPEDIC SURGERY      right total knee     TOTAL KNEE ARTHROPLASTY Right 2009       Social History     Tobacco Use     Smoking status: Never Smoker     Smokeless tobacco: Never Used   Substance Use Topics     Alcohol use: Yes     Comment: Alcoholic Drinks/day: alittle     Family History   Problem Relation Age of Onset     Cerebrovascular Disease Mother       Lung Cancer Father      Harley's esophagus Father      Diabetes No family hx of      Glaucoma No family hx of      Macular Degeneration No family hx of          Current Outpatient Medications   Medication Sig Dispense Refill     aspirin 81 MG tablet Take 81 mg by mouth daily       atorvastatin (LIPITOR) 20 MG tablet Take 1 tablet (20 mg) by mouth daily 90 tablet 0     losartan (COZAAR) 50 MG tablet Take 1 tablet (50 mg) by mouth daily 90 tablet 1     metoprolol succinate ER (TOPROL-XL) 50 MG 24 hr tablet Take 1 tablet (50 mg) by mouth daily 90 tablet 1     tamsulosin (FLOMAX) 0.4 MG capsule Take 1 capsule (0.4 mg) by mouth daily 90 capsule 1     Allergies   Allergen Reactions     Strawberry Hives       Breast Cancer Screening:        History of abnormal Pap smear: N/A      Reviewed and updated as needed this visit by clinical staff  Tobacco  Allergies  Meds  Problems             Reviewed and updated as needed this visit by Provider   Allergies  Meds  Problems            Past Medical History:   Diagnosis Date     Coronary artery disease      Polio      Stented coronary artery       Past Surgical History:   Procedure Laterality Date     ARTHRODESIS ANKLE Right 1/14/2015    Procedure: ARTHRODESIS ANKLE;  Surgeon: Sundar Chavez MD;  [facility]     ARTHRODESIS ANKLE Bilateral Right 2015, Left at age 15     ARTHROPLASTY KNEE      Right     CARDIAC SURGERY      LAD stent     CATARACT IOL, RT/LT       ENT SURGERY      tonsillectomy 7 yo     IR LUMBAR EPIDURAL STEROID INJECTION  11/3/2009     IR LUMBAR EPIDURAL STEROID INJECTION  3/9/2010     ORTHOPEDIC SURGERY      left foot arthrodesis when 14 yo     ORTHOPEDIC SURGERY      right total knee     TOTAL KNEE ARTHROPLASTY Right 2009       Review of Systems       OBJECTIVE:   /56 (BP Location: Right arm, Patient Position: Sitting, Cuff Size: Adult Large)   Pulse 72   Resp 16   Wt 84 kg (185 lb 3.2 oz)   BMI 28.16 kg/m    Physical Exam  GENERAL:  healthy, alert and no distress  EYES: Eyes grossly normal to inspection, PERRL and conjunctivae and sclerae normal  HENT: ear canals and TM's normal, nose and mouth without ulcers or lesions  NECK: no adenopathy, no asymmetry, masses, or scars and thyroid normal to palpation  RESP: lungs clear to auscultation - no rales, rhonchi or wheezes  CV: regular rate and rhythm, normal S1 S2, no S3 or S4, no murmur, click or rub, no peripheral edema and peripheral pulses strong  ABDOMEN: soft, nontender, no hepatosplenomegaly, no masses and bowel sounds normal  MS: unable to walk/stand independently. (+) atrophy of LE bilaterally. RLE weakness. Right shoulder ROM is intact with weakness with overhead reach.   SKIN: no suspicious lesions or rashes  NEURO: Normal strength and tone, mentation intact and speech normal  PSYCH: mentation appears normal, affect normal/bright    ASSESSMENT/PLAN:       ICD-10-CM    1. Routine general medical examination at a health care facility  Z00.00 Basic metabolic panel  (Ca, Cl, CO2, Creat, Gluc, K, Na, BUN)     Lipid Profile (Chol, Trig, HDL, LDL calc)     Basic metabolic panel  (Ca, Cl, CO2, Creat, Gluc, K, Na, BUN)     Lipid Profile (Chol, Trig, HDL, LDL calc)   2. Incomplete bladder emptying  R33.9 tamsulosin (FLOMAX) 0.4 MG capsule   3. Squamous cell carcinoma of skin of face  C44.320    4. Other hyperlipidemia  E78.49 atorvastatin (LIPITOR) 20 MG tablet   5. Essential hypertension, benign  I10 losartan (COZAAR) 50 MG tablet     metoprolol succinate ER (TOPROL-XL) 50 MG 24 hr tablet   6. Brachial plexopathy  G54.0 Physical Therapy Referral   7. Scoliosis, unspecified scoliosis type, unspecified spinal region  M41.9    8. History of poliomyelitis  Z86.12 Wheelchair Scooter Order for DME - ONLY FOR DME   9. Right leg weakness  R29.898 Wheelchair Scooter Order for DME - ONLY FOR DME   10. Encounter for Medicare annual wellness exam  Z00.00    11. History of MI (myocardial infarction)  I25.2   "  12. Difficulty walking  R26.2      - Hx of polio as a child and has right leg weakness- uses walking sticks at baseline. Fused his left ankle and has right knee replaced and ankle fusion. Has muscle atrophy of his LE bilaterally. Ambulation is difficult - has motorized scooter for long distance.  Would like to get a new and wants to know if insurance will cover. DME order placed today.   - Hx of MI about 12 years ago - s/p stent and no issues since then. Doesn't follow with cards. No anginal sxs.   - BP generally well controlled   - Brachial plexopathy with RUE weakness and used to follow with neuro but doesn't see anyone anymore. S/p physical theray but still has weakness with overhead reach. Amenable to seeing PT , referral placed.   - Home: wife and kids   - no falls over the last year   - no smoking, marijuana, vaping, drugs, ETOH   - diet/exercise: good diet but exercise is limited   - memory,  ADL and IADL intact  - no safety issues   - PSA - 2019 was normal. (+) BPH and the flomax help   - ACP - will bring in a copy  - colonoscopy - 2019 - Several polyps, return in 3 years. In 2022   - will talk to insurance about shingles and back to use  - UTD on COVID and flu     Patient has been advised of split billing requirements and indicates understanding: Yes  COUNSELING:  Reviewed preventive health counseling, as reflected in patient instructions       Advance Care Planning    Estimated body mass index is 28.16 kg/m  as calculated from the following:    Height as of 8/20/20: 1.727 m (5' 8\").    Weight as of this encounter: 84 kg (185 lb 3.2 oz).    Weight management plan: Discussed healthy diet and exercise guidelines    He reports that he has never smoked. He has never used smokeless tobacco.    Jennyfer Kendall DO  [employer]      The patient was provided with suggestions to help him develop a healthy physical lifestyle.  He is at risk for lack of exercise and has been provided with " information to increase physical activity for the benefit of his well-being.

## 2021-10-18 ENCOUNTER — OFFICE VISIT (OUTPATIENT)
Dept: FAMILY MEDICINE | Facility: CLINIC | Age: 81
End: 2021-10-18
Payer: COMMERCIAL

## 2021-10-18 VITALS
WEIGHT: 185.2 LBS | HEART RATE: 72 BPM | RESPIRATION RATE: 16 BRPM | SYSTOLIC BLOOD PRESSURE: 100 MMHG | DIASTOLIC BLOOD PRESSURE: 56 MMHG | BODY MASS INDEX: 28.16 KG/M2

## 2021-10-18 DIAGNOSIS — R29.898 RIGHT LEG WEAKNESS: ICD-10-CM

## 2021-10-18 DIAGNOSIS — E78.49 OTHER HYPERLIPIDEMIA: ICD-10-CM

## 2021-10-18 DIAGNOSIS — Z00.00 ENCOUNTER FOR MEDICARE ANNUAL WELLNESS EXAM: Primary | ICD-10-CM

## 2021-10-18 DIAGNOSIS — R33.9 INCOMPLETE BLADDER EMPTYING: ICD-10-CM

## 2021-10-18 DIAGNOSIS — I10 ESSENTIAL HYPERTENSION, BENIGN: ICD-10-CM

## 2021-10-18 DIAGNOSIS — G54.0 BRACHIAL PLEXOPATHY: ICD-10-CM

## 2021-10-18 DIAGNOSIS — R26.2 DIFFICULTY WALKING: ICD-10-CM

## 2021-10-18 DIAGNOSIS — Z86.12 HISTORY OF POLIOMYELITIS: ICD-10-CM

## 2021-10-18 DIAGNOSIS — C44.320 SQUAMOUS CELL CARCINOMA OF SKIN OF FACE: ICD-10-CM

## 2021-10-18 DIAGNOSIS — M41.9 SCOLIOSIS, UNSPECIFIED SCOLIOSIS TYPE, UNSPECIFIED SPINAL REGION: ICD-10-CM

## 2021-10-18 DIAGNOSIS — I25.2 HISTORY OF MI (MYOCARDIAL INFARCTION): ICD-10-CM

## 2021-10-18 PROBLEM — L82.1 SEBORRHEIC KERATOSIS: Status: ACTIVE | Noted: 2021-10-18

## 2021-10-18 PROBLEM — I25.10 CORONARY ATHEROSCLEROSIS: Status: ACTIVE | Noted: 2021-10-18

## 2021-10-18 PROBLEM — M19.90 OSTEOARTHROSIS: Status: ACTIVE | Noted: 2021-10-18

## 2021-10-18 PROBLEM — E78.5 HYPERLIPIDEMIA: Status: ACTIVE | Noted: 2021-10-18

## 2021-10-18 PROBLEM — Z95.5 HISTORY OF HEART ARTERY STENT: Status: ACTIVE | Noted: 2017-08-23

## 2021-10-18 LAB
ANION GAP SERPL CALCULATED.3IONS-SCNC: 10 MMOL/L (ref 5–18)
BUN SERPL-MCNC: 14 MG/DL (ref 8–28)
CALCIUM SERPL-MCNC: 9.5 MG/DL (ref 8.5–10.5)
CHLORIDE BLD-SCNC: 103 MMOL/L (ref 98–107)
CHOLEST SERPL-MCNC: 116 MG/DL
CO2 SERPL-SCNC: 27 MMOL/L (ref 22–31)
CREAT SERPL-MCNC: 0.76 MG/DL (ref 0.7–1.3)
FASTING STATUS PATIENT QL REPORTED: NORMAL
GFR SERPL CREATININE-BSD FRML MDRD: 87 ML/MIN/1.73M2
GLUCOSE BLD-MCNC: 96 MG/DL (ref 70–125)
HDLC SERPL-MCNC: 49 MG/DL
LDLC SERPL CALC-MCNC: 53 MG/DL
POTASSIUM BLD-SCNC: 4.5 MMOL/L (ref 3.5–5)
SODIUM SERPL-SCNC: 140 MMOL/L (ref 136–145)
TRIGL SERPL-MCNC: 72 MG/DL

## 2021-10-18 PROCEDURE — 36415 COLL VENOUS BLD VENIPUNCTURE: CPT | Performed by: STUDENT IN AN ORGANIZED HEALTH CARE EDUCATION/TRAINING PROGRAM

## 2021-10-18 PROCEDURE — 99397 PER PM REEVAL EST PAT 65+ YR: CPT | Performed by: STUDENT IN AN ORGANIZED HEALTH CARE EDUCATION/TRAINING PROGRAM

## 2021-10-18 PROCEDURE — 80048 BASIC METABOLIC PNL TOTAL CA: CPT | Performed by: STUDENT IN AN ORGANIZED HEALTH CARE EDUCATION/TRAINING PROGRAM

## 2021-10-18 PROCEDURE — 80061 LIPID PANEL: CPT | Performed by: STUDENT IN AN ORGANIZED HEALTH CARE EDUCATION/TRAINING PROGRAM

## 2021-10-18 RX ORDER — MUPIROCIN 20 MG/G
OINTMENT TOPICAL
COMMUNITY
Start: 2021-09-15 | End: 2021-10-18

## 2021-10-18 RX ORDER — TAMSULOSIN HYDROCHLORIDE 0.4 MG/1
0.4 CAPSULE ORAL DAILY
Qty: 90 CAPSULE | Refills: 1 | Status: SHIPPED | OUTPATIENT
Start: 2021-10-18 | End: 2022-06-24

## 2021-10-18 RX ORDER — ATORVASTATIN CALCIUM 20 MG/1
20 TABLET, FILM COATED ORAL DAILY
Qty: 90 TABLET | Refills: 0 | Status: SHIPPED | OUTPATIENT
Start: 2021-10-18 | End: 2021-11-24

## 2021-10-18 RX ORDER — LOSARTAN POTASSIUM 50 MG/1
50 TABLET ORAL DAILY
Qty: 90 TABLET | Refills: 1 | Status: SHIPPED | OUTPATIENT
Start: 2021-10-18 | End: 2022-04-26

## 2021-10-18 RX ORDER — METOPROLOL SUCCINATE 50 MG/1
50 TABLET, EXTENDED RELEASE ORAL DAILY
Qty: 90 TABLET | Refills: 1 | Status: SHIPPED | OUTPATIENT
Start: 2021-10-18 | End: 2022-05-11

## 2021-10-18 ASSESSMENT — ACTIVITIES OF DAILY LIVING (ADL)
CURRENT_FUNCTION: LAUNDRY REQUIRES ASSISTANCE
CURRENT_FUNCTION: SHOPPING REQUIRES ASSISTANCE

## 2021-10-18 NOTE — PATIENT INSTRUCTIONS
Patient Education   Personalized Prevention Plan  You are due for the preventive services outlined below.  Your care team is available to assist you in scheduling these services.  If you have already completed any of these items, please share that information with your care team to update in your medical record.  Health Maintenance Due   Topic Date Due     Hepatitis C Screening  Never done     Zoster (Shingles) Vaccine (1 of 2) Never done     FALL RISK ASSESSMENT  08/20/2021     Diptheria Tetanus Pertussis (DTAP/TDAP/TD) Vaccine (3 - Td or Tdap) 10/17/2021     Your Health Risk Assessment indicates you feel you are not in good health    A healthy lifestyle helps keep the body fit and the mind alert. It helps protect you from disease, helps you fight disease, and helps prevent chronic disease (disease that doesn't go away) from getting worse. This is important as you get older and begin to notice twinges in muscles and joints and a decline in the strength and stamina you once took for granted. A healthy lifestyle includes good healthcare, good nutrition, weight control, recreation, and regular exercise. Avoid harmful substances and do what you can to keep safe. Another part of a healthy lifestyle is stay mentally active and socially involved.    Good healthcare     Have a wellness visit every year.     If you have new symptoms, let us know right away. Don't wait until the next checkup.     Take medicines exactly as prescribed and keep your medicines in a safe place. Tell us if your medicine causes problems.   Healthy diet and weight control     Eat 3 or 4 small, nutritious, low-fat, high-fiber meals a day. Include a variety of fruits, vegetables, and whole-grain foods.     Make sure you get enough calcium in your diet. Calcium, vitamin D, and exercise help prevent osteoporosis (bone thinning).     If you live alone, try eating with others when you can. That way you get a good meal and have company while you eat it.      Try to keep a healthy weight. If you eat more calories than your body uses for energy, it will be stored as fat and you will gain weight.     Recreation   Recreation is not limited to sports and team events. It includes any activity that provides relaxation, interest, enjoyment, and exercise. Recreation provides an outlet for physical, mental, and social energy. It can give a sense of worth and achievement. It can help you stay healthy.    Mental Exercise and Social Involvement  Mental and emotional health is as important as physical health. Keep in touch with friends and family. Stay as active as possible. Continue to learn and challenge yourself.   Things you can do to stay mentally active are:    Learn something new, like a foreign language or musical instrument.     Play SCRABBLE or do crossword puzzles. If you cannot find people to play these games with you at home, you can play them with others on your computer through the Internet.     Join a games club--anything from card games to chess or checkers or lawn bowling.     Start a new hobby.     Go back to school.     Volunteer.     Read.   Keep up with world events.    Exercise for a Healthier Heart  You may wonder how you can improve the health of your heart. If you re thinking about exercise, you re on the right track. You don t need to become an athlete. But you do need a certain amount of brisk exercise to help strengthen your heart. If you have been diagnosed with a heart condition, your healthcare provider may advise exercise to help stabilize your condition. To help make exercise a habit, choose safe, fun activities.      Exercise with a friend. When activity is fun, you're more likely to stick with it.   Before you start  Check with your healthcare provider before starting an exercise program. This is especially important if you have not been active for a while. It's also important if you have a long-term (chronic) health problem such as heart  disease, diabetes, or obesity. Or if you are at high risk for having these problems.   Why exercise?  Exercising regularly offers many healthy rewards. It can help you do all of the following:     Improve your blood cholesterol level to help prevent further heart trouble    Lower your blood pressure to help prevent a stroke or heart attack    Control diabetes, or reduce your risk of getting this disease    Improve your heart and lung function    Reach and stay at a healthy weight    Make your muscles stronger so you can stay active    Prevent falls and fractures by slowing the loss of bone mass (osteoporosis)    Manage stress better    Reduce your blood pressure    Improve your sense of self and your body image  Exercise tips      Ease into your routine. Set small goals. Then build on them. If you are not sure what your activity level should be, talk with your healthcare provider first before starting an exercise routine.    Exercise on most days. Aim for a total of 150 minutes (2 hours and 30 minutes) or more of moderate-intensity aerobic activity each week. Or 75 minutes (1 hour and 15 minutes) or more of vigorous-intensity aerobic activity each week. Or try for a combination of both. Moderate activity means that you breathe heavier and your heart rate increases but you can still talk. Think about doing 40 minutes of moderate exercise, 3 to 4 times a week. For best results, activity should last for about 40 minutes to lower blood pressure and cholesterol. It's OK to work up to the 40-minute period over time. Examples of moderate-intensity activity are walking 1 mile in 15 minutes. Or doing 30 to 45 minutes of yard work.    Step up your daily activity level.  Along with your exercise program, try being more active the whole day. Walk instead of drive. Or park further away so that you take more steps each day. Do more household tasks or yard work. You may not be able to meet the advised mount of physical activity.  But doing some moderate- or vigorous-intensity aerobic activity can help reduce your risk for heart disease. Your healthcare provider can help you figure out what is best for you.    Choose 1 or more activities you enjoy.  Walking is one of the easiest things you can do. You can also try swimming, riding a bike, dancing, or taking an exercise class.    When to call your healthcare provider  Call your healthcare provider if you have any of these:     Chest pain or feel dizzy or lightheaded    Burning, tightness, pressure, or heaviness in your chest, neck, shoulders, back, or arms    Abnormal shortness of breath    More joint or muscle pain    A very fast or irregular heartbeat (palpitations)  [employer] last reviewed this educational content on 7/1/2019 2000-2021 [employer]. All rights reserved. This information is not intended as a substitute for professional medical care. Always follow your healthcare professional's instructions.

## 2021-10-25 ENCOUNTER — TELEPHONE (OUTPATIENT)
Dept: FAMILY MEDICINE | Facility: CLINIC | Age: 81
End: 2021-10-25

## 2021-10-25 NOTE — TELEPHONE ENCOUNTER
Left message informing pt to check his mychart and to call back or message us if he has questions.

## 2021-10-25 NOTE — TELEPHONE ENCOUNTER
----- Message from Jennyfer Kendall DO sent at 10/25/2021  7:11 AM CDT -----  Please call patient and relay results of [employer] message as I received a notification that result note has not been viewed by patient. Is unable to get in touch with patient after 2 attempts, please send letter.

## 2021-11-09 ENCOUNTER — OFFICE VISIT (OUTPATIENT)
Dept: OPHTHALMOLOGY | Facility: CLINIC | Age: 81
End: 2021-11-09
Attending: OPHTHALMOLOGY
Payer: COMMERCIAL

## 2021-11-09 DIAGNOSIS — H43.813 PVD (POSTERIOR VITREOUS DETACHMENT), BOTH EYES: Primary | ICD-10-CM

## 2021-11-09 DIAGNOSIS — H43.393 VITREOUS SYNERESIS OF BOTH EYES: ICD-10-CM

## 2021-11-09 PROCEDURE — G0463 HOSPITAL OUTPT CLINIC VISIT: HCPCS | Mod: 25

## 2021-11-09 PROCEDURE — 67031 LASER SURGERY EYE STRANDS: CPT | Performed by: OPHTHALMOLOGY

## 2021-11-09 ASSESSMENT — CONF VISUAL FIELD
OD_NORMAL: 1
OS_NORMAL: 1
METHOD: COUNTING FINGERS

## 2021-11-09 ASSESSMENT — TONOMETRY
OS_IOP_MMHG: 12
OD_IOP_MMHG: 14
IOP_METHOD: TONOPEN

## 2021-11-09 ASSESSMENT — VISUAL ACUITY
OD_PH_SC: 20/25
OD_SC: 20/60
METHOD: SNELLEN - LINEAR
OS_PH_SC: 20/30
OS_SC: 20/60
OS_PH_SC+: -1

## 2021-11-09 ASSESSMENT — SLIT LAMP EXAM - LIDS
COMMENTS: NORMAL
COMMENTS: NORMAL

## 2021-11-09 ASSESSMENT — EXTERNAL EXAM - RIGHT EYE: OD_EXAM: NORMAL

## 2021-11-09 ASSESSMENT — CUP TO DISC RATIO
OD_RATIO: 0.05
OS_RATIO: 0.1

## 2021-11-09 ASSESSMENT — EXTERNAL EXAM - LEFT EYE: OS_EXAM: NORMAL

## 2021-11-09 NOTE — NURSING NOTE
Chief Complaints and History of Present Illnesses   Patient presents with     Follow Up      Chief Complaint(s) and History of Present Illness(es)     Follow Up     Laterality: both eyes    Associated symptoms: floaters.  Negative for eye pain, tearing and redness    Treatments tried: no treatments    Pain scale: 0/10              Comments     Follow up PVD each eye.; s/p YAG vitreolysis right eye 10/07/2021.  Patient says right eye is better in some ways but still has some problems with small floaters and one area of diffuse blurriness.  Left eye no changes, still has very noticeable floater problems.  Eye meds: none  PATRICIA Rios 11/9/2021 10:21 AM

## 2021-11-09 NOTE — PROGRESS NOTES
Chief Complaint(s) and History of Present Illness(es)     Follow Up     Laterality: both eyes    Associated symptoms: floaters.  Negative for eye pain, tearing and   redness    Treatments tried: no treatments    Pain scale: 0/10              Comments     Follow up PVD each eye.; s/p YAG vitreolysis right eye 10/07/2021.  Patient says right eye is better in some ways but still has some problems   with small floaters and one area of diffuse blurriness.  Left eye no changes, still has very noticeable floater problems.  Eye meds: none  PATRICIA Rios 11/9/2021 10:21 AM              Review of systems for the eyes was negative other than the pertinent positives/negatives listed in the HPI.      Assessment & Plan      Silvano Cruz is a 81 year old male with the following diagnoses:   1. PVD (posterior vitreous detachment), both eyes    2. Vitreous syneresis of both eyes       S/p YAG Vitreolysis left eye (10/7/21)  Still noticing small floaters in left eye but mainly bothered by large floater in LEFT eye  Interested in YAG vitreolysis in left eye today    R/B/A discussed. Consent obtained. Plan for YAG vitreolysis LEFT eye today   Return precautions reviewed     Patient disposition:   Return in about 4 weeks (around 12/7/2021) for Follow Up, DFE, possible YAG vitreolysis..    More Archuleta MD  Ophthalmology Resident, PGY-4       Attending Physician Attestation:  Complete documentation of historical and exam elements from today's encounter can be found in the full encounter summary report (not reduplicated in this progress note).  I personally obtained the chief complaint(s) and history of present illness.  I confirmed and edited as necessary the review of systems, past medical/surgical history, family history, social history, and examination findings as documented by others; and I examined the patient myself.  I personally reviewed the relevant tests, images, and reports as documented above.  I formulated and  edited as necessary the assessment and plan and discussed the findings and management plan with the patient and family. . - Attending Physician Procedure Attestation: I was present for the entire procedure     Miguel Serrano MD

## 2021-11-22 DIAGNOSIS — E78.49 OTHER HYPERLIPIDEMIA: ICD-10-CM

## 2021-11-24 RX ORDER — ATORVASTATIN CALCIUM 20 MG/1
20 TABLET, FILM COATED ORAL DAILY
Qty: 90 TABLET | Refills: 0 | Status: SHIPPED | OUTPATIENT
Start: 2021-11-24 | End: 2021-11-24

## 2021-11-24 RX ORDER — ATORVASTATIN CALCIUM 20 MG/1
20 TABLET, FILM COATED ORAL DAILY
Qty: 90 TABLET | Refills: 3 | Status: SHIPPED | OUTPATIENT
Start: 2021-11-24 | End: 2022-10-05

## 2022-04-23 DIAGNOSIS — I10 ESSENTIAL HYPERTENSION, BENIGN: ICD-10-CM

## 2022-04-29 RX ORDER — LOSARTAN POTASSIUM 50 MG/1
50 TABLET ORAL DAILY
Qty: 90 TABLET | Refills: 1 | Status: SHIPPED | OUTPATIENT
Start: 2022-04-29 | End: 2022-10-05

## 2022-04-29 NOTE — TELEPHONE ENCOUNTER
"Last Written Prescription Date:  10/18/21  Last Fill Quantity: 90,  # refills: 1   Last office visit provider:  10/18/21     Requested Prescriptions   Pending Prescriptions Disp Refills     losartan (COZAAR) 50 MG tablet 90 tablet 1     Sig: Take 1 tablet (50 mg) by mouth daily       Angiotensin-II Receptors Passed - 4/29/2022  7:47 AM        Passed - Last blood pressure under 140/90 in past 12 months     BP Readings from Last 3 Encounters:   10/18/21 100/56   08/20/20 92/58   09/23/19 116/70                 Passed - Recent (12 mo) or future (30 days) visit within the authorizing provider's specialty     Patient has had an office visit with the authorizing provider or a provider within the authorizing providers department within the previous 12 mos or has a future within next 30 days. See \"Patient Info\" tab in inbasket, or \"Choose Columns\" in Meds & Orders section of the refill encounter.              Passed - Medication is active on med list        Passed - Patient is age 18 or older        Passed - Normal serum creatinine on file in past 12 months     Recent Labs   Lab Test 10/18/21  0929   CR 0.76       Ok to refill medication if creatinine is low          Passed - Normal serum potassium on file in past 12 months     Recent Labs   Lab Test 10/18/21  0929   POTASSIUM 4.5                         Masood Quigley RN 04/29/22 7:47 AM  "

## 2022-05-08 DIAGNOSIS — I10 ESSENTIAL HYPERTENSION, BENIGN: ICD-10-CM

## 2022-05-11 RX ORDER — METOPROLOL SUCCINATE 50 MG/1
50 TABLET, EXTENDED RELEASE ORAL DAILY
Qty: 90 TABLET | Refills: 1 | Status: SHIPPED | OUTPATIENT
Start: 2022-05-11 | End: 2022-10-05

## 2022-05-11 NOTE — TELEPHONE ENCOUNTER
"Last Written Prescription Date:  10/18/21  Last Fill Quantity: 90,  # refills: 1   Last office visit provider:  10/18/21     Requested Prescriptions   Pending Prescriptions Disp Refills     metoprolol succinate ER (TOPROL XL) 50 MG 24 hr tablet 90 tablet 1     Sig: Take 1 tablet (50 mg) by mouth daily       Beta-Blockers Protocol Passed - 5/11/2022 11:04 AM        Passed - Blood pressure under 140/90 in past 12 months     BP Readings from Last 3 Encounters:   10/18/21 100/56   08/20/20 92/58   09/23/19 116/70                 Passed - Patient is age 6 or older        Passed - Recent (12 mo) or future (30 days) visit within the authorizing provider's specialty     Patient has had an office visit with the authorizing provider or a provider within the authorizing providers department within the previous 12 mos or has a future within next 30 days. See \"Patient Info\" tab in inbasket, or \"Choose Columns\" in Meds & Orders section of the refill encounter.              Passed - Medication is active on med list             Masood Quigley RN 05/11/22 11:04 AM  "

## 2022-06-23 DIAGNOSIS — R33.9 INCOMPLETE BLADDER EMPTYING: ICD-10-CM

## 2022-06-23 NOTE — TELEPHONE ENCOUNTER
Reason for Call:  Medication or medication refill:  Refill    Do you use a [facility]? No  Name of the pharmacy and phone number for the current request:    [facility] in [facility]    Name of the medication requested: Tamsulosin 0.4 mg capsule     Last visit with PCP : 10/18/2021    Call taken on 6/23/2022 at 3:06 PM by Kellen Suresh

## 2022-06-24 RX ORDER — TAMSULOSIN HYDROCHLORIDE 0.4 MG/1
0.4 CAPSULE ORAL DAILY
Qty: 90 CAPSULE | Refills: 1 | Status: SHIPPED | OUTPATIENT
Start: 2022-06-24 | End: 2022-10-05

## 2022-06-24 NOTE — TELEPHONE ENCOUNTER
"Last Written Prescription Date:  10/18/21  Last Fill Quantity: 90,  # refills: 1   Last office visit provider:  10/18/21     Requested Prescriptions   Pending Prescriptions Disp Refills     tamsulosin (FLOMAX) 0.4 MG capsule 90 capsule 1     Sig: Take 1 capsule (0.4 mg) by mouth daily       Alpha Blockers Passed - 6/24/2022 10:52 AM        Passed - Blood pressure under 140/90 in past 12 months     BP Readings from Last 3 Encounters:   10/18/21 100/56   08/20/20 92/58   09/23/19 116/70                 Passed - Recent (12 mo) or future (30 days) visit within the authorizing provider's specialty     Patient has had an office visit with the authorizing provider or a provider within the authorizing providers department within the previous 12 mos or has a future within next 30 days. See \"Patient Info\" tab in inbasket, or \"Choose Columns\" in Meds & Orders section of the refill encounter.              Passed - Patient does not have Tadalafil, Vardenafil, or Sildenafil on their medication list        Passed - Medication is active on med list        Passed - Patient is 18 years of age or older             Masood Quigley RN 06/24/22 10:52 AM  "

## 2022-07-11 ENCOUNTER — TELEPHONE (OUTPATIENT)
Dept: FAMILY MEDICINE | Facility: CLINIC | Age: 82
End: 2022-07-11

## 2022-07-11 NOTE — TELEPHONE ENCOUNTER
Reason for Call:  Other prescription Standard Written Order    Detailed comments: Patient needs RX for electric scooter, states about a year ago Dr. Kendall wrote one for him and he has misplaced it.    Would like it updated and uploaded to [facility] so he can print it off and take to DME Supplier.    Phone Number Patient can be reached at: Home number on file [phone] (home)    Best Time: Any    Can we leave a detailed message on this number? YES    Call taken on 7/11/2022 at 1:52 PM by Kellen Suresh

## 2022-07-11 NOTE — LETTER
7/11/2022        RE: Silvano Cruz  [address] [place] [address]        Hi Silvano,     Here is the DME order for the scooter that you have requested! Please call us with any questions you have at the number listed above.       Sincerely,    Silvano ORELLANA CMA on behalf of Dr. Kendall

## 2022-07-12 NOTE — TELEPHONE ENCOUNTER
The order is still in his chart, please print it off and mail it to him or call him to  the order at the      Also advise him to set up his appt for his annual sometime in October as my schedule is filling up fast

## 2022-07-13 NOTE — TELEPHONE ENCOUNTER
LMTCB     Will mail order out. Asked pt to call back to schedule appt after 10/18/22 for AWV.       Please assist in scheduling that when pt calls back.       Thank you,  Silvano Hill Jr., CMA on 7/13/2022 at 11:47 AM

## 2022-09-10 ENCOUNTER — HEALTH MAINTENANCE LETTER (OUTPATIENT)
Age: 82
End: 2022-09-10

## 2022-10-05 ENCOUNTER — OFFICE VISIT (OUTPATIENT)
Dept: FAMILY MEDICINE | Facility: CLINIC | Age: 82
End: 2022-10-05
Payer: COMMERCIAL

## 2022-10-05 VITALS
BODY MASS INDEX: 28.48 KG/M2 | WEIGHT: 187.9 LBS | HEIGHT: 68 IN | DIASTOLIC BLOOD PRESSURE: 64 MMHG | SYSTOLIC BLOOD PRESSURE: 102 MMHG

## 2022-10-05 DIAGNOSIS — R05.3 CHRONIC COUGH: Primary | ICD-10-CM

## 2022-10-05 DIAGNOSIS — I25.2 HISTORY OF MI (MYOCARDIAL INFARCTION): ICD-10-CM

## 2022-10-05 DIAGNOSIS — M89.671: ICD-10-CM

## 2022-10-05 DIAGNOSIS — G56.01 CARPAL TUNNEL SYNDROME OF RIGHT WRIST: ICD-10-CM

## 2022-10-05 DIAGNOSIS — Z23 ENCOUNTER FOR IMMUNIZATION: ICD-10-CM

## 2022-10-05 DIAGNOSIS — M18.11 ARTHRITIS OF CARPOMETACARPAL (CMC) JOINT OF RIGHT THUMB: ICD-10-CM

## 2022-10-05 DIAGNOSIS — G54.0 BRACHIAL PLEXOPATHY: ICD-10-CM

## 2022-10-05 DIAGNOSIS — B91: ICD-10-CM

## 2022-10-05 DIAGNOSIS — I10 ESSENTIAL HYPERTENSION, BENIGN: ICD-10-CM

## 2022-10-05 DIAGNOSIS — R26.2 DIFFICULTY WALKING: ICD-10-CM

## 2022-10-05 DIAGNOSIS — E78.49 OTHER HYPERLIPIDEMIA: ICD-10-CM

## 2022-10-05 DIAGNOSIS — R33.9 INCOMPLETE BLADDER EMPTYING: ICD-10-CM

## 2022-10-05 PROCEDURE — 90662 IIV NO PRSV INCREASED AG IM: CPT | Performed by: STUDENT IN AN ORGANIZED HEALTH CARE EDUCATION/TRAINING PROGRAM

## 2022-10-05 PROCEDURE — 90472 IMMUNIZATION ADMIN EACH ADD: CPT | Performed by: STUDENT IN AN ORGANIZED HEALTH CARE EDUCATION/TRAINING PROGRAM

## 2022-10-05 PROCEDURE — 91312 COVID-19,PF,PFIZER BOOSTER BIVALENT: CPT | Performed by: STUDENT IN AN ORGANIZED HEALTH CARE EDUCATION/TRAINING PROGRAM

## 2022-10-05 PROCEDURE — 0124A COVID-19,PF,PFIZER BOOSTER BIVALENT: CPT | Performed by: STUDENT IN AN ORGANIZED HEALTH CARE EDUCATION/TRAINING PROGRAM

## 2022-10-05 PROCEDURE — 99214 OFFICE O/P EST MOD 30 MIN: CPT | Mod: 25 | Performed by: STUDENT IN AN ORGANIZED HEALTH CARE EDUCATION/TRAINING PROGRAM

## 2022-10-05 PROCEDURE — 90714 TD VACC NO PRESV 7 YRS+ IM: CPT | Performed by: STUDENT IN AN ORGANIZED HEALTH CARE EDUCATION/TRAINING PROGRAM

## 2022-10-05 PROCEDURE — G0008 ADMIN INFLUENZA VIRUS VAC: HCPCS | Performed by: STUDENT IN AN ORGANIZED HEALTH CARE EDUCATION/TRAINING PROGRAM

## 2022-10-05 RX ORDER — ATORVASTATIN CALCIUM 20 MG/1
20 TABLET, FILM COATED ORAL DAILY
Qty: 90 TABLET | Refills: 3 | Status: SHIPPED | OUTPATIENT
Start: 2022-10-05 | End: 2023-11-29

## 2022-10-05 RX ORDER — TAMSULOSIN HYDROCHLORIDE 0.4 MG/1
0.4 CAPSULE ORAL DAILY
Qty: 90 CAPSULE | Refills: 1 | Status: SHIPPED | OUTPATIENT
Start: 2022-10-05 | End: 2023-04-08

## 2022-10-05 RX ORDER — METOPROLOL SUCCINATE 50 MG/1
50 TABLET, EXTENDED RELEASE ORAL DAILY
Qty: 90 TABLET | Refills: 1 | Status: SHIPPED | OUTPATIENT
Start: 2022-10-05 | End: 2023-04-08

## 2022-10-05 RX ORDER — LOSARTAN POTASSIUM 50 MG/1
50 TABLET ORAL DAILY
Qty: 90 TABLET | Refills: 1 | Status: SHIPPED | OUTPATIENT
Start: 2022-10-05 | End: 2023-04-10

## 2022-10-05 ASSESSMENT — ENCOUNTER SYMPTOMS
CONSTIPATION: 0
EYE PAIN: 0
HEARTBURN: 0
COUGH: 1
HEMATOCHEZIA: 0
FREQUENCY: 0
CHILLS: 0
PALPITATIONS: 0
MYALGIAS: 0
JOINT SWELLING: 0
DYSURIA: 0
WEAKNESS: 0
NAUSEA: 0
HEADACHES: 0
SORE THROAT: 0
ARTHRALGIAS: 1
SHORTNESS OF BREATH: 0
ABDOMINAL PAIN: 0
PARESTHESIAS: 0
DIARRHEA: 0
FEVER: 0
HEMATURIA: 0
NERVOUS/ANXIOUS: 0
DIZZINESS: 0

## 2022-10-05 ASSESSMENT — ACTIVITIES OF DAILY LIVING (ADL): CURRENT_FUNCTION: NO ASSISTANCE NEEDED

## 2022-10-05 NOTE — PROGRESS NOTES
Assessment & Plan         ICD-10-CM    1. Chronic cough  R05.3 omeprazole (PRILOSEC) 20 MG DR capsule   2. Other hyperlipidemia  E78.49 atorvastatin (LIPITOR) 20 MG tablet   3. Essential hypertension, benign  I10 losartan (COZAAR) 50 MG tablet     metoprolol succinate ER (TOPROL XL) 50 MG 24 hr tablet   4. Incomplete bladder emptying  R33.9 tamsulosin (FLOMAX) 0.4 MG capsule   5. Encounter for immunization  Z23 INFLUENZA, QUAD, HIGH DOSE, PF, 65YR + (FLUZONE HD)     COVID-19,PF,PFIZER BOOSTER BIVALENT (12+YRS)     TD PRESERV FREE, IM (7+ YRS) (DECAVAC/TENIVAC)   6. Carpal tunnel syndrome of right wrist  G56.01 Orthopedic  Referral   7. Arthritis of carpometacarpal (CMC) joint of right thumb  M18.11    8. 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 Difficulty walking  R26.2    10. Brachial plexopathy  G54.0    11. History of MI (myocardial infarction)  I25.2        CMC arthritis of right hand and right carpal tunnel-is planning to follow with orthopedics to get the carpal release surgery and discuss options for the arthritis.Referral for [facility] placed.     Chronic cough:  Has had a chronic cough for many years.  Initially, it was thought to be due to the lisinopril he was on.  Has been switched to losartan for many years but continues to have the cough.  It is especially bothersome at night when he is laying flat and will change positions.  Is not associated with wheezing or any shortness of breath.  Discussed that chronic cough can be due to uncontrolled asthma or uncontrolled/silent reflux.  Patient notes that his dad had Harley's esophagus from silent reflux.  After some discussion, decided to do a 3-month burst of PPI to see if that will help with his cough.  If it does not, then we can trial inhaler.    Colonoscopy in November    Right leg poliomyelitis:  Had polio as a child and uses walking sticks at baseline.  Has his left ankle to use.  Has right knee replaced and  "ankle fusion  Has muscle atrophy of his lower extremities bilaterally  Last time that he was seen, he was requesting a motorized scooter so that he could travel long distances.  He did obtain it and it helps immensely.  However, Medicare has not reimbursed him for it because they need him to \"prove\" that he got the scooter at an alternate company because [employer] was unable to provide it.  He would like to send me some paperwork regarding it.  Told him I would review it and see if I can fill it out.    History of MI s/p PCI: Stable today.    Right sided brachial plexopathy: Used to follow with neuro but does not follow anybody anymore.  Last time he was here, was sent to physical therapy for significantly decreased range of motion.  Today, he reports that his range of motion has vastly improved since going to PT.  However, he has not had complete resolution of symptoms.  Patient's expectations were managed that he will most likely have some level of symptoms always but he looks much more improved.  I think it is okay to monitor clinically.  He is okay with that.    36 minutes spent on the date of the encounter doing chart review, history and exam, documentation and further activities per the note    Return in about 2 months (around 12/5/2022) for AWV .    Jennyfer Kendall [facility] [place]    Finesse Schaefer is a 82 year old, presenting for the following health issues:  Recheck Medication (Cough from losartan) and Arthritis (Arthritis pain in hands)    Was initially here for Medicare annual visit but he has a few days early.  Unable to do the annual visit today as it will be an out-of-pocket cost.  He will come back in December for it.  Patient amenable to switching this to an office visit        Review of Systems   As per HPI       Objective    /64 (BP Location: Right arm, Patient Position: Sitting, Cuff Size: Adult Large)   Ht 1.727 m (5' 8\")   Wt 85.2 kg (187 lb 14.4 oz)   BMI " 28.57 kg/m    Body mass index is 28.57 kg/m .  Physical Exam   GENERAL: healthy, alert and no distress  MS:   Right shoulder - ROM largely intact   Right leg: stiff and has difficulty walking/standing independently without the cane.   PSYCH: mentation appears normal, affect normal

## 2022-11-07 ENCOUNTER — HOSPITAL ENCOUNTER (OUTPATIENT)
Facility: HOSPITAL | Age: 82
Discharge: HOME | End: 2022-11-07
Attending: INTERNAL MEDICINE | Admitting: INTERNAL MEDICINE
Payer: COMMERCIAL

## 2022-11-07 VITALS
BODY MASS INDEX: 28.34 KG/M2 | DIASTOLIC BLOOD PRESSURE: 64 MMHG | SYSTOLIC BLOOD PRESSURE: 116 MMHG | HEART RATE: 60 BPM | WEIGHT: 187 LBS | RESPIRATION RATE: 16 BRPM | TEMPERATURE: 97.2 F | HEIGHT: 68 IN | OXYGEN SATURATION: 95 %

## 2022-11-07 LAB — COLONOSCOPY: NORMAL

## 2022-11-07 PROCEDURE — 45385 COLONOSCOPY W/LESION REMOVAL: CPT | Performed by: INTERNAL MEDICINE

## 2022-11-07 PROCEDURE — G0500 MOD SEDAT ENDO SERVICE >5YRS: HCPCS | Performed by: INTERNAL MEDICINE

## 2022-11-07 PROCEDURE — 250N000011 HC RX IP 250 OP 636: Performed by: INTERNAL MEDICINE

## 2022-11-07 PROCEDURE — 45378 DIAGNOSTIC COLONOSCOPY: CPT | Performed by: INTERNAL MEDICINE

## 2022-11-07 PROCEDURE — 99153 MOD SED SAME PHYS/QHP EA: CPT | Performed by: INTERNAL MEDICINE

## 2022-11-07 PROCEDURE — 88305 TISSUE EXAM BY PATHOLOGIST: CPT | Mod: TC | Performed by: INTERNAL MEDICINE

## 2022-11-07 RX ORDER — NALOXONE HYDROCHLORIDE 0.4 MG/ML
0.4 INJECTION, SOLUTION INTRAMUSCULAR; INTRAVENOUS; SUBCUTANEOUS
Status: CANCELLED | OUTPATIENT
Start: 2022-11-07

## 2022-11-07 RX ORDER — ONDANSETRON 4 MG/1
4 TABLET, ORALLY DISINTEGRATING ORAL EVERY 6 HOURS PRN
Status: CANCELLED | OUTPATIENT
Start: 2022-11-07

## 2022-11-07 RX ORDER — ONDANSETRON 2 MG/ML
4 INJECTION INTRAMUSCULAR; INTRAVENOUS EVERY 6 HOURS PRN
Status: CANCELLED | OUTPATIENT
Start: 2022-11-07

## 2022-11-07 RX ORDER — PROCHLORPERAZINE MALEATE 5 MG
5 TABLET ORAL EVERY 6 HOURS PRN
Status: CANCELLED | OUTPATIENT
Start: 2022-11-07

## 2022-11-07 RX ORDER — FENTANYL CITRATE 50 UG/ML
INJECTION, SOLUTION INTRAMUSCULAR; INTRAVENOUS PRN
Status: DISCONTINUED | OUTPATIENT
Start: 2022-11-07 | End: 2022-11-07 | Stop reason: HOSPADM

## 2022-11-07 RX ORDER — NALOXONE HYDROCHLORIDE 0.4 MG/ML
0.2 INJECTION, SOLUTION INTRAMUSCULAR; INTRAVENOUS; SUBCUTANEOUS
Status: CANCELLED | OUTPATIENT
Start: 2022-11-07

## 2022-11-07 ASSESSMENT — ACTIVITIES OF DAILY LIVING (ADL)
ADLS_ACUITY_SCORE: 35
ADLS_ACUITY_SCORE: 35

## 2022-11-07 NOTE — H&P
"Silvano Cruz is an 82 year old male.  He has had a history of adenomatous colon polyps.  Last colonoscopy in 2019 and was due for this follow-up colonoscopy 3 years later now.  No symptoms, bleeding.    Past Medical History:   Diagnosis Date     Coronary artery disease      Polio      Stented coronary artery        Allergies:   Allergies   Allergen Reactions     Strawberry Hives       Active Problems:    * No active hospital problems. *    Blood pressure 113/69, pulse 70, temperature 97.2  F (36.2  C), temperature source Temporal, resp. rate 14, height 1.727 m (5' 8\"), weight 84.8 kg (187 lb), SpO2 98 %.    Review of Systems    Physical Exam  General: No acute distress.  Cor: Regular rate and rhythm  Lungs: Clear to auscultation  Abdomen: Soft, benign    Assessment:  History of colon polyps-patient due for follow-up colonoscopy.  Risk, benefits, alternatives discussed with the patient.  Informed consent was obtained.    Plan:  Proceed with colonoscopy.    Jaycob Crane MD  11/7/2022  "

## 2022-11-07 NOTE — PROGRESS NOTES
Pt brought in a picture of a negative antigen home test. He said he took the photo yesterday. Will continue with colonoscopy as planned.

## 2022-11-08 LAB
PATH REPORT.COMMENTS IMP SPEC: NORMAL
PATH REPORT.COMMENTS IMP SPEC: NORMAL
PATH REPORT.FINAL DX SPEC: NORMAL
PATH REPORT.GROSS SPEC: NORMAL
PATH REPORT.MICROSCOPIC SPEC OTHER STN: NORMAL
PATH REPORT.RELEVANT HX SPEC: NORMAL
PHOTO IMAGE: NORMAL

## 2022-11-08 PROCEDURE — 88305 TISSUE EXAM BY PATHOLOGIST: CPT | Mod: 26 | Performed by: PATHOLOGY

## 2023-01-22 ENCOUNTER — HEALTH MAINTENANCE LETTER (OUTPATIENT)
Age: 83
End: 2023-01-22

## 2023-01-23 ASSESSMENT — ENCOUNTER SYMPTOMS
COUGH: 0
ABDOMINAL PAIN: 0
HEMATURIA: 0
ARTHRALGIAS: 1
DIARRHEA: 0
HEADACHES: 0
EYE PAIN: 0
DIZZINESS: 0
CONSTIPATION: 0
PARESTHESIAS: 0
NAUSEA: 0
CHILLS: 0
JOINT SWELLING: 0
HEARTBURN: 0
SORE THROAT: 0
DYSURIA: 0
SHORTNESS OF BREATH: 0
HEMATOCHEZIA: 0
PALPITATIONS: 0
FREQUENCY: 0
MYALGIAS: 0
NERVOUS/ANXIOUS: 0
FEVER: 0
WEAKNESS: 1

## 2023-01-23 ASSESSMENT — ACTIVITIES OF DAILY LIVING (ADL): CURRENT_FUNCTION: NO ASSISTANCE NEEDED

## 2023-01-24 ENCOUNTER — OFFICE VISIT (OUTPATIENT)
Dept: FAMILY MEDICINE | Facility: CLINIC | Age: 83
End: 2023-01-24
Payer: COMMERCIAL

## 2023-01-24 VITALS
OXYGEN SATURATION: 98 % | HEART RATE: 77 BPM | WEIGHT: 187.2 LBS | HEIGHT: 68 IN | SYSTOLIC BLOOD PRESSURE: 106 MMHG | DIASTOLIC BLOOD PRESSURE: 60 MMHG | BODY MASS INDEX: 28.37 KG/M2

## 2023-01-24 DIAGNOSIS — G54.0 BRACHIAL PLEXOPATHY: ICD-10-CM

## 2023-01-24 DIAGNOSIS — N40.1 BENIGN PROSTATIC HYPERPLASIA WITH INCOMPLETE BLADDER EMPTYING: ICD-10-CM

## 2023-01-24 DIAGNOSIS — Z86.12 HISTORY OF POLIOMYELITIS: ICD-10-CM

## 2023-01-24 DIAGNOSIS — R26.81 GAIT INSTABILITY: ICD-10-CM

## 2023-01-24 DIAGNOSIS — Z95.5 HISTORY OF HEART ARTERY STENT: ICD-10-CM

## 2023-01-24 DIAGNOSIS — I25.2 HISTORY OF MI (MYOCARDIAL INFARCTION): ICD-10-CM

## 2023-01-24 DIAGNOSIS — Z00.00 ENCOUNTER FOR MEDICARE ANNUAL WELLNESS EXAM: Primary | ICD-10-CM

## 2023-01-24 DIAGNOSIS — R29.898 RIGHT LEG WEAKNESS: ICD-10-CM

## 2023-01-24 DIAGNOSIS — M89.671: ICD-10-CM

## 2023-01-24 DIAGNOSIS — H43.393 VITREOUS FLOATERS OF BOTH EYES: ICD-10-CM

## 2023-01-24 DIAGNOSIS — B91: ICD-10-CM

## 2023-01-24 DIAGNOSIS — R39.14 BENIGN PROSTATIC HYPERPLASIA WITH INCOMPLETE BLADDER EMPTYING: ICD-10-CM

## 2023-01-24 DIAGNOSIS — G54.0 BRACHIAL PLEXUS NEUROPATHY OF RIGHT UPPER EXTREMITY: ICD-10-CM

## 2023-01-24 DIAGNOSIS — M18.11 PRIMARY OSTEOARTHRITIS OF FIRST CARPOMETACARPAL JOINT OF RIGHT HAND: ICD-10-CM

## 2023-01-24 DIAGNOSIS — Z85.828 HISTORY OF BASAL CELL CARCINOMA: ICD-10-CM

## 2023-01-24 DIAGNOSIS — Z12.5 ENCOUNTER FOR SCREENING FOR MALIGNANT NEOPLASM OF PROSTATE: ICD-10-CM

## 2023-01-24 LAB
ANION GAP SERPL CALCULATED.3IONS-SCNC: 16 MMOL/L (ref 7–15)
BUN SERPL-MCNC: 17.7 MG/DL (ref 8–23)
CALCIUM SERPL-MCNC: 9.7 MG/DL (ref 8.8–10.2)
CHLORIDE SERPL-SCNC: 103 MMOL/L (ref 98–107)
CHOLEST SERPL-MCNC: 112 MG/DL
CREAT SERPL-MCNC: 0.64 MG/DL (ref 0.67–1.17)
DEPRECATED HCO3 PLAS-SCNC: 21 MMOL/L (ref 22–29)
GFR SERPL CREATININE-BSD FRML MDRD: >90 ML/MIN/1.73M2
GLUCOSE SERPL-MCNC: 91 MG/DL (ref 70–99)
HDLC SERPL-MCNC: 49 MG/DL
LDLC SERPL CALC-MCNC: 43 MG/DL
NONHDLC SERPL-MCNC: 63 MG/DL
POTASSIUM SERPL-SCNC: 4.3 MMOL/L (ref 3.4–5.3)
PSA SERPL-MCNC: 0.9 NG/ML
SODIUM SERPL-SCNC: 140 MMOL/L (ref 136–145)
TRIGL SERPL-MCNC: 100 MG/DL

## 2023-01-24 PROCEDURE — 80061 LIPID PANEL: CPT | Performed by: STUDENT IN AN ORGANIZED HEALTH CARE EDUCATION/TRAINING PROGRAM

## 2023-01-24 PROCEDURE — G0439 PPPS, SUBSEQ VISIT: HCPCS | Performed by: STUDENT IN AN ORGANIZED HEALTH CARE EDUCATION/TRAINING PROGRAM

## 2023-01-24 PROCEDURE — G0103 PSA SCREENING: HCPCS | Performed by: STUDENT IN AN ORGANIZED HEALTH CARE EDUCATION/TRAINING PROGRAM

## 2023-01-24 PROCEDURE — 80048 BASIC METABOLIC PNL TOTAL CA: CPT | Performed by: STUDENT IN AN ORGANIZED HEALTH CARE EDUCATION/TRAINING PROGRAM

## 2023-01-24 PROCEDURE — 36415 COLL VENOUS BLD VENIPUNCTURE: CPT | Performed by: STUDENT IN AN ORGANIZED HEALTH CARE EDUCATION/TRAINING PROGRAM

## 2023-01-24 ASSESSMENT — ENCOUNTER SYMPTOMS
COUGH: 0
ABDOMINAL PAIN: 0
SHORTNESS OF BREATH: 0
NAUSEA: 0
CHILLS: 0
FEVER: 0
HEARTBURN: 0
PARESTHESIAS: 0
PALPITATIONS: 0
NERVOUS/ANXIOUS: 0
EYE PAIN: 0
SORE THROAT: 0
DYSURIA: 0
HEMATURIA: 0
MYALGIAS: 0
CONSTIPATION: 0
FREQUENCY: 0
WEAKNESS: 1
JOINT SWELLING: 0
HEMATOCHEZIA: 0
DIARRHEA: 0
HEADACHES: 0
ARTHRALGIAS: 1
DIZZINESS: 0

## 2023-01-24 ASSESSMENT — ACTIVITIES OF DAILY LIVING (ADL): CURRENT_FUNCTION: NO ASSISTANCE NEEDED

## 2023-01-24 NOTE — PROGRESS NOTES
"SUBJECTIVE:   Silvano is a 82 year old who presents for Preventive Visit.  Patient has been advised of split billing requirements and indicates understanding: Yes  Are you in the first 12 months of your Medicare coverage?  No    Healthy Habits:     In general, how would you rate your overall health?  Good    Frequency of exercise:  2-3 days/week    Duration of exercise:  15-30 minutes    Do you usually eat at least 4 servings of fruit and vegetables a day, include whole grains    & fiber and avoid regularly eating high fat or \"junk\" foods?  Yes    Taking medications regularly:  Yes    Medication side effects:  Not applicable    Ability to successfully perform activities of daily living:  No assistance needed    Home Safety:  No safety concerns identified    Hearing Impairment:  No hearing concerns    In the past 6 months, have you been bothered by leaking of urine?  No    In general, how would you rate your overall mental or emotional health?  Good      PHQ-2 Total Score: 0    Additional concerns today:  No      Have you ever done Advance Care Planning? (For example, a Health Directive, POLST, or a discussion with a medical provider or your loved ones about your wishes): Yes, patient states has an Advance Care Planning document and will bring a copy to the clinic.      Fall risk  Fallen 2 or more times in the past year?: No  Any fall with injury in the past year?: No    Cognitive Screening   1) Repeat 3 items (Leader, Season, Table)    2) Clock draw: NORMAL  3) 3 item recall: Recalls 3 objects  Results: 3 items recalled: COGNITIVE IMPAIRMENT LESS LIKELY    Mini-CogTM Copyright RAEGAN Murcia. Licensed by the author for use in [facility]; reprinted with permission (azar@.[facility]). All rights reserved.      Do you have sleep apnea, excessive snoring or daytime drowsiness?: no    Reviewed and updated as needed this visit by clinical staff   Tobacco  Allergies  Meds              Reviewed and updated as needed " this visit by Provider                 Social History     Tobacco Use     Smoking status: Never     Smokeless tobacco: Never   Substance Use Topics     Alcohol use: Yes     Comment: Alcoholic Drinks/day: alittle     If you drink alcohol do you typically have >3 drinks per day or >7 drinks per week? No    Alcohol Use 1/23/2023   Prescreen: >3 drinks/day or >7 drinks/week? No   Prescreen: >3 drinks/day or >7 drinks/week? -   No flowsheet data found.    Current providers sharing in care for this patient include:   Patient Care Team:  Jennyfer Kendall DO as PCP - General (Family Medicine)  Sundar Chavez MD as MD (Orthopedics)  Favian Griffith DPM (Podiatry)  Miguel Serrano MD as Assigned Surgical Provider  Jennyfer Kendall DO as Assigned PCP    The following health maintenance items are reviewed in Epic and correct as of today:  Health Maintenance   Topic Date Due     ZOSTER IMMUNIZATION (1 of 2) Never done     MEDICARE ANNUAL WELLNESS VISIT  10/18/2022     ANNUAL REVIEW OF HM ORDERS  10/18/2022     FALL RISK ASSESSMENT  01/24/2024     LIPID  10/18/2026     ADVANCE CARE PLANNING  10/18/2026     COLORECTAL CANCER SCREENING  11/07/2029     DTAP/TDAP/TD IMMUNIZATION (5 - Td or Tdap) 10/05/2032     PHQ-2 (once per calendar year)  Completed     INFLUENZA VACCINE  Completed     Pneumococcal Vaccine: 65+ Years  Completed     COVID-19 Vaccine  Completed     IPV IMMUNIZATION  Aged Out     MENINGITIS IMMUNIZATION  Aged Out     Lab work is in process  Labs reviewed in EPIC  BP Readings from Last 3 Encounters:   01/24/23 106/60   11/07/22 116/64   10/05/22 102/64    Wt Readings from Last 3 Encounters:   01/24/23 84.9 kg (187 lb 3.2 oz)   11/07/22 84.8 kg (187 lb)   10/05/22 85.2 kg (187 lb 14.4 oz)                  Patient Active Problem List   Diagnosis     Pain in joint involving ankle and foot     Impaired gait     Benign essential hypertension     Brachial plexopathy     Coronary atherosclerosis     History  of heart artery stent     History of poliomyelitis     Hyperlipidemia     Incomplete bladder emptying     Osteoarthrosis     Scoliosis     Seborrheic keratosis     Difficulty walking     Squamous cell carcinoma of skin of face     Right leg weakness     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)     Benign neoplasm of ascending colon     Diverticular disease of large intestine     History of colonic polyps     Past Surgical History:   Procedure Laterality Date     ARTHRODESIS ANKLE Right 1/14/2015    Procedure: ARTHRODESIS ANKLE;  Surgeon: Sundar Chavez MD;  [facility]     ARTHRODESIS ANKLE Bilateral Right 2015, Left at age 15     ARTHROPLASTY KNEE      Right     CARDIAC SURGERY      LAD stent     CATARACT IOL, RT/LT       COLONOSCOPY N/A 11/7/2022    Procedure: COLONOSCOPY with cold polypectomies;  Surgeon: Jaycob Crane MD;  Location: [facility]     ENT SURGERY      tonsillectomy 7 yo     IR LUMBAR EPIDURAL STEROID INJECTION  11/3/2009     IR LUMBAR EPIDURAL STEROID INJECTION  3/9/2010     ORTHOPEDIC SURGERY      left foot arthrodesis when 14 yo     ORTHOPEDIC SURGERY      right total knee     TOTAL KNEE ARTHROPLASTY Right 2009       Social History     Tobacco Use     Smoking status: Never     Smokeless tobacco: Never   Substance Use Topics     Alcohol use: Yes     Comment: Alcoholic Drinks/day: alittle     Family History   Problem Relation Age of Onset     Cerebrovascular Disease Mother      Lung Cancer Father      Harley's esophagus Father      Diabetes No family hx of      Glaucoma No family hx of      Macular Degeneration No family hx of          Current Outpatient Medications   Medication Sig Dispense Refill     aspirin 81 MG tablet Take 81 mg by mouth daily       atorvastatin (LIPITOR) 20 MG tablet Take 1 tablet (20 mg) by mouth daily 90 tablet 3     losartan (COZAAR) 50 MG tablet Take 1 tablet (50 mg) by mouth daily 90 tablet 1     metoprolol succinate ER (TOPROL XL)  "50 MG 24 hr tablet Take 1 tablet (50 mg) by mouth daily 90 tablet 1     tamsulosin (FLOMAX) 0.4 MG capsule Take 1 capsule (0.4 mg) by mouth daily 90 capsule 1     Allergies   Allergen Reactions     [place]cadia Hives     Review of Systems   Constitutional: Negative for chills and fever.   HENT: Negative for congestion, ear pain, hearing loss and sore throat.    Eyes: Negative for pain and visual disturbance.   Respiratory: Negative for cough and shortness of breath.    Cardiovascular: Negative for chest pain, palpitations and peripheral edema.   Gastrointestinal: Negative for abdominal pain, constipation, diarrhea, heartburn, hematochezia and nausea.   Genitourinary: Negative for dysuria, frequency, genital sores, hematuria, impotence, penile discharge and urgency.   Musculoskeletal: Positive for arthralgias. Negative for joint swelling and myalgias.   Skin: Negative for rash.   Neurological: Positive for weakness. Negative for dizziness, headaches and paresthesias.   Psychiatric/Behavioral: Negative for mood changes. The patient is not nervous/anxious.        OBJECTIVE:   /60 (BP Location: Right arm, Patient Position: Sitting, Cuff Size: Adult Regular)   Pulse 77   Ht 1.727 m (5' 8\")   Wt 84.9 kg (187 lb 3.2 oz)   SpO2 98%   BMI 28.46 kg/m   Estimated body mass index is 28.46 kg/m  as calculated from the following:    Height as of this encounter: 1.727 m (5' 8\").    Weight as of this encounter: 84.9 kg (187 lb 3.2 oz).  Physical Exam  GENERAL: healthy, alert and no distress  NECK: no adenopathy, no asymmetry, masses, or scars and thyroid normal to palpation  RESP: lungs clear to auscultation - no rales, rhonchi or wheezes  CV: regular rate and rhythm, normal S1 S2, no S3 or S4, no murmur, click or rub, no peripheral edema and peripheral pulses strong  PSYCH: mentation appears normal, affect normal/bright  ASSESSMENT / PLAN:       ICD-10-CM    1. Encounter for Medicare annual wellness exam  Z00.00 PSA, " screen     Creatinine     Lipid Profile (Chol, Trig, HDL, LDL calc)     Basic metabolic panel  (Ca, Cl, CO2, Creat, Gluc, K, Na, BUN)      2. Encounter for screening for malignant neoplasm of prostate  Z12.5 PSA, screen     PSA, screen      3. 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 Benign prostatic hyperplasia with incomplete bladder emptying  N40.1     R39.14       5. Gait instability  R26.81       6. Brachial plexopathy  G54.0       7. Right leg weakness  R29.898       8. 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 History of heart artery stent  Z95.5       10. History of MI (myocardial infarction)  I25.2       11. Vitreous floaters of both eyes  H43.393       12. History of poliomyelitis  Z86.12       13. History of basal cell carcinoma  Z85.828       14. Primary osteoarthritis of first carpometacarpal joint of right hand  M18.11               Patient is a pleasant 82-year-old male with left leg poliomyelitis osteopathy, SCC of the face, brachial plexopathy, essential hypertension, who presents today for an annual physical.    Home life: Lives with wife and kids.  Occupation: Retired  Sexually active: No  Safety concerns: None identified  Diet/exercise: Significantly limited due to his polymyositis.  Tries to stay as active as he can and work on a daily basis.  Family history of cancers: father had lung cancer ( smoker) , brother has barretts   Eye exam/dental exam: UTD  Alcohol use: stable   Tobacco use/marijuana use/drug use:no   Mental health: Stable  Vaccines: UTD on dentist, needs to make an eye appointment blood work: Ordered       Colon cancer screening:last in 11/2022 --> (+) tubular adenoma without any high grade dysplasia. No clear return date on  Report. Per patient, return in 3  Years.   PSA: Ordered    IADLs/ADLs intact: intact   Driving: yes   Memory:no issues   Constipation, vision, hearing concerns: no concerns   Falls over the last  year: (+) near falls 2/2 poliomyelitis.     History of facial BCC:  Follows with [facility] and UTD on annual per patient     CMC arthritis of right hand and right carpal tunnel: Referral to [place] given last time.  He notes he still needs to make an appointment.    Right leg poliomyelitis:  Had polio as a child and uses walking sticks at baseline.  Has his left ankle to use.  Has right knee replaced and ankle fusion  Has muscle atrophy of his lower extremities bilaterally  Feels that his symptoms areProgressing slowly and it makes working out and doing things around the house difficulties and some level of chronic pain all the time.  Discussed plan back to PT that he would like to hold for now.  Tries to stay as active as possible    History of incomplete bladder emptying:  Will check PSA  Is stable with no acute progression and no gross hematuria     Chronic cough:  Has had it for many years.  Was given 3-month burst of PPI that he did trial last time but he did not feel that it helped very much.  He has been trying to pay attention to his cough and he notices that it is worse when his cat is nearby.  He is working on avoiding his cat and doing allergy meds which does seem to help.    Right-sided brachial plexopathy   is s/p PT with improvement of his range of motion  Continues to follow with neuro breath and lost to follow-up  Last time, patient seem to be doing okay as far as his range of motion and symptoms.  Today he is a bit worse but does not think that he wants to follow back up with PT or neurology at this time.    History of MI s/p PCI:  Currently on aspirin +  moderate intensity statin.  Discussed going up to Lipitor 40 mg but patient is concerned about muscle side effects.  Would like to leave the medication as is for now.    Floaters in the eyes and s/p laser treatment  Feels like the floaters are getting worse, he is going to make an appointment with his eye doctor  No changes in visual acuity or eye  "pain      Patient has been advised of split billing requirements and indicates understanding: Yes      COUNSELING:  Reviewed preventive health counseling, as reflected in patient instructions      BMI:   Estimated body mass index is 28.46 kg/m  as calculated from the following:    Height as of this encounter: 1.727 m (5' 8\").    Weight as of this encounter: 84.9 kg (187 lb 3.2 oz).   Weight management plan: Discussed healthy diet and exercise guidelines      He reports that he has never smoked. He has never used smokeless tobacco.      Appropriate preventive services were discussed with this patient, including applicable screening as appropriate for cardiovascular disease, diabetes, osteopenia/osteoporosis, and glaucoma.  As appropriate for age/gender, discussed screening for colorectal cancer, prostate cancer, breast cancer, and cervical cancer. Checklist reviewing preventive services available has been given to the patient.    Reviewed patients plan of care and provided an AVS. The Basic Care Plan (routine screening as documented in Health Maintenance) for Silvano meets the Care Plan requirement. This Care Plan has been established and reviewed with the Patient.      Jennyfer Kendall DO  [facility]    Identified Health Risks:  "

## 2023-01-24 NOTE — PATIENT INSTRUCTIONS
Patient Education   Personalized Prevention Plan  You are due for the preventive services outlined below.  Your care team is available to assist you in scheduling these services.  If you have already completed any of these items, please share that information with your care team to update in your medical record.  Health Maintenance Due   Topic Date Due     Zoster (Shingles) Vaccine (1 of 2) Never done     Annual Wellness Visit  10/18/2022     ANNUAL REVIEW OF HM ORDERS  10/18/2022

## 2023-01-29 PROBLEM — K57.30 DIVERTICULAR DISEASE OF LARGE INTESTINE: Status: ACTIVE | Noted: 2019-09-10

## 2023-01-29 PROBLEM — D12.2 BENIGN NEOPLASM OF ASCENDING COLON: Status: ACTIVE | Noted: 2019-09-12

## 2023-01-29 PROBLEM — Z86.0100 HISTORY OF COLONIC POLYPS: Status: ACTIVE | Noted: 2019-09-12

## 2023-04-01 PROBLEM — I25.2 HISTORY OF MI (MYOCARDIAL INFARCTION): Status: ACTIVE | Noted: 2023-04-01

## 2023-04-07 DIAGNOSIS — R33.9 INCOMPLETE BLADDER EMPTYING: ICD-10-CM

## 2023-04-07 DIAGNOSIS — I10 ESSENTIAL HYPERTENSION, BENIGN: ICD-10-CM

## 2023-04-08 RX ORDER — TAMSULOSIN HYDROCHLORIDE 0.4 MG/1
0.4 CAPSULE ORAL DAILY
Qty: 90 CAPSULE | Refills: 2 | Status: SHIPPED | OUTPATIENT
Start: 2023-04-08 | End: 2024-03-15

## 2023-04-08 RX ORDER — METOPROLOL SUCCINATE 50 MG/1
50 TABLET, EXTENDED RELEASE ORAL DAILY
Qty: 90 TABLET | Refills: 2 | Status: SHIPPED | OUTPATIENT
Start: 2023-04-08 | End: 2024-01-24

## 2023-04-09 NOTE — TELEPHONE ENCOUNTER
"Last Written Prescription Date: 10/5/22   Last Fill Quantity: 90,  # refills: 1   Last office visit provider:  1/24/23           tamsulosin (FLOMAX) 0.4 MG capsule 90 capsule 1     Sig: Take 1 capsule (0.4 mg) by mouth daily       Alpha Blockers Passed - 4/7/2023  4:52 PM        Passed - Blood pressure under 140/90 in past 12 months     BP Readings from Last 3 Encounters:   01/24/23 106/60   11/07/22 116/64   10/05/22 102/64                 Passed - Recent (12 mo) or future (30 days) visit within the authorizing provider's specialty     Patient has had an office visit with the authorizing provider or a provider within the authorizing providers department within the previous 12 mos or has a future within next 30 days. See \"Patient Info\" tab in inbasket, or \"Choose Columns\" in Meds & Orders section of the refill encounter.              Passed - Patient does not have Tadalafil, Vardenafil, or Sildenafil on their medication list        Passed - Medication is active on med list        Passed - Patient is 18 years of age or older           metoprolol succinate ER (TOPROL XL) 50 MG 24 hr tablet 90 tablet 1     Sig: Take 1 tablet (50 mg) by mouth daily       Beta-Blockers Protocol Passed - 4/7/2023  4:52 PM        Passed - Blood pressure under 140/90 in past 12 months     BP Readings from Last 3 Encounters:   01/24/23 106/60   11/07/22 116/64   10/05/22 102/64                 Passed - Patient is age 6 or older        Passed - Recent (12 mo) or future (30 days) visit within the authorizing provider's specialty     Patient has had an office visit with the authorizing provider or a provider within the authorizing providers department within the previous 12 mos or has a future within next 30 days. See \"Patient Info\" tab in inbasket, or \"Choose Columns\" in Meds & Orders section of the refill encounter.              Passed - Medication is active on med list             Edita Pike RN 04/08/23 11:06 PM  "

## 2023-04-09 NOTE — TELEPHONE ENCOUNTER
"Routing refill request to provider for review/approval because:  Labs out of range:  Creatinine 0.64 1/24/23    Last Written Prescription Date:  10/5/22  Last Fill Quantity: 90,  # refills: 1  Last office visit provider:  1/24/23    Requested Prescriptions   Pending Prescriptions Disp Refills     losartan (COZAAR) 50 MG tablet 90 tablet 1     Sig: Take 1 tablet (50 mg) by mouth daily       Angiotensin-II Receptors Failed - 4/7/2023  4:52 PM        Failed - Normal serum creatinine on file in past 12 months     Recent Labs   Lab Test 01/24/23  1300   CR 0.64*       Ok to refill medication if creatinine is low          Passed - Last blood pressure under 140/90 in past 12 months     BP Readings from Last 3 Encounters:   01/24/23 106/60   11/07/22 116/64   10/05/22 102/64                 Passed - Recent (12 mo) or future (30 days) visit within the authorizing provider's specialty     Patient has had an office visit with the authorizing provider or a provider within the authorizing providers department within the previous 12 mos or has a future within next 30 days. See \"Patient Info\" tab in inbasket, or \"Choose Columns\" in Meds & Orders section of the refill encounter.              Passed - Medication is active on med list        Passed - Patient is age 18 or older        Passed - Normal serum potassium on file in past 12 months     Recent Labs   Lab Test 01/24/23  1300   POTASSIUM 4.3                  Edita Pike RN 04/08/23 11:10 PM  "

## 2023-04-10 RX ORDER — LOSARTAN POTASSIUM 50 MG/1
50 TABLET ORAL DAILY
Qty: 90 TABLET | Refills: 1 | Status: SHIPPED | OUTPATIENT
Start: 2023-04-10 | End: 2023-10-09

## 2023-10-09 DIAGNOSIS — I10 ESSENTIAL HYPERTENSION, BENIGN: ICD-10-CM

## 2023-10-09 RX ORDER — LOSARTAN POTASSIUM 50 MG/1
50 TABLET ORAL DAILY
Qty: 90 TABLET | Refills: 0 | Status: SHIPPED | OUTPATIENT
Start: 2023-10-09 | End: 2024-01-30

## 2023-10-09 NOTE — TELEPHONE ENCOUNTER
Refill request for Losartan from [facility] jennifer ORELLANA  [facility] Clinical Staff

## 2023-11-16 ENCOUNTER — NURSE TRIAGE (OUTPATIENT)
Dept: NURSING | Facility: CLINIC | Age: 83
End: 2023-11-16
Payer: COMMERCIAL

## 2023-11-16 NOTE — TELEPHONE ENCOUNTER
Nurse Triage SBAR    Situation: Covid question    Background: Patient calling. Last week Tuesday, 11/7/2023,  he has a runny nose and cough. States the cough got pretty bad. Loss of taste. He tested positive for covid on Friday. Cough and other symptoms have improved.     Assessment: Patient stated he is feeling better but he retested and it came back positive. Patient states he only has a slight runny nose. No SOB, no chest pain. Feeling a little weak. No cough. No headache. Patient denied triage and stated he just wanted to know when to quarantine for, when he can start going out again and how long he can test positive for covid.     Recommendation: According to the protocol, Patient should do home care. Home care reviewed. Advised Patient that the patient needs to do home care. Home care reviewed. Care advice given. Patient verbalizes understanding and agrees with plan of care. Reviewed concerning symptoms and when to call back.     Abbey Finley RN Nursing Advisor 11/16/2023 3:46 PM     Reason for Disposition   COVID-19 Home Isolation, questions about   COVID-19 Prevention and Healthy Living, questions about   COVID-19 Disease, questions about   COVID-19 Testing, questions about    Protocols used: Coronavirus (COVID-19) Diagnosed or [id number]

## 2023-11-29 DIAGNOSIS — E78.49 OTHER HYPERLIPIDEMIA: ICD-10-CM

## 2023-11-29 RX ORDER — ATORVASTATIN CALCIUM 20 MG/1
20 TABLET, FILM COATED ORAL DAILY
Qty: 90 TABLET | Refills: 1 | Status: SHIPPED | OUTPATIENT
Start: 2023-11-29 | End: 2024-04-09

## 2023-11-30 ENCOUNTER — TRANSFERRED RECORDS (OUTPATIENT)
Dept: HEALTH INFORMATION MANAGEMENT | Facility: CLINIC | Age: 83
End: 2023-11-30
Payer: COMMERCIAL

## 2023-12-13 ENCOUNTER — TRANSFERRED RECORDS (OUTPATIENT)
Dept: HEALTH INFORMATION MANAGEMENT | Facility: CLINIC | Age: 83
End: 2023-12-13
Payer: COMMERCIAL

## 2023-12-21 ENCOUNTER — TRANSFERRED RECORDS (OUTPATIENT)
Dept: HEALTH INFORMATION MANAGEMENT | Facility: CLINIC | Age: 83
End: 2023-12-21
Payer: COMMERCIAL

## 2024-01-18 ENCOUNTER — PATIENT OUTREACH (OUTPATIENT)
Dept: CARE COORDINATION | Facility: CLINIC | Age: 84
End: 2024-01-18
Payer: COMMERCIAL

## 2024-01-24 DIAGNOSIS — I10 ESSENTIAL HYPERTENSION, BENIGN: ICD-10-CM

## 2024-01-24 NOTE — LETTER
January 29, 2024      Silvano Cruz  [address] [place] [address]        Dear Silvano,     We attempted to reach you via phone, but received no response. We recently refill one of your medications, but prior to additional refills an appointment is needed. Please call us at [phone].       Sincerely,        Jennyfer Kendall, DO

## 2024-01-25 RX ORDER — METOPROLOL SUCCINATE 50 MG/1
50 TABLET, EXTENDED RELEASE ORAL DAILY
Qty: 90 TABLET | Refills: 0 | Status: SHIPPED | OUTPATIENT
Start: 2024-01-25 | End: 2024-04-09

## 2024-01-25 NOTE — TELEPHONE ENCOUNTER
Call to let patient know:     Been more than 1 year since last seen.     Refill x 1 provided. Needs to be seen for ongoing refills.

## 2024-01-30 DIAGNOSIS — I10 ESSENTIAL HYPERTENSION, BENIGN: ICD-10-CM

## 2024-01-31 RX ORDER — LOSARTAN POTASSIUM 50 MG/1
50 TABLET ORAL DAILY
Qty: 90 TABLET | Refills: 0 | Status: SHIPPED | OUTPATIENT
Start: 2024-01-31 | End: 2024-04-09

## 2024-01-31 NOTE — TELEPHONE ENCOUNTER
Patient Returning Call    Reason for call:  Returning Call to Clinic    Information relayed to patient:  Relayed message from Dr. Kendall.  Accepted offer to schedule AWV 04/09/24    Patient has additional questions:  No

## 2024-01-31 NOTE — TELEPHONE ENCOUNTER
Left message for patient to call back. When  he calls back please relay message below and assist as needed

## 2024-02-01 ENCOUNTER — PATIENT OUTREACH (OUTPATIENT)
Dept: CARE COORDINATION | Facility: CLINIC | Age: 84
End: 2024-02-01
Payer: COMMERCIAL

## 2024-03-15 DIAGNOSIS — R33.9 INCOMPLETE BLADDER EMPTYING: ICD-10-CM

## 2024-03-18 RX ORDER — TAMSULOSIN HYDROCHLORIDE 0.4 MG/1
0.4 CAPSULE ORAL DAILY
Qty: 30 CAPSULE | Refills: 0 | Status: SHIPPED | OUTPATIENT
Start: 2024-03-18 | End: 2024-04-09

## 2024-04-08 SDOH — HEALTH STABILITY: PHYSICAL HEALTH: ON AVERAGE, HOW MANY MINUTES DO YOU ENGAGE IN EXERCISE AT THIS LEVEL?: PATIENT DECLINED

## 2024-04-08 SDOH — HEALTH STABILITY: PHYSICAL HEALTH
ON AVERAGE, HOW MANY DAYS PER WEEK DO YOU ENGAGE IN MODERATE TO STRENUOUS EXERCISE (LIKE A BRISK WALK)?: PATIENT DECLINED

## 2024-04-08 ASSESSMENT — SOCIAL DETERMINANTS OF HEALTH (SDOH): HOW OFTEN DO YOU GET TOGETHER WITH FRIENDS OR RELATIVES?: ONCE A WEEK

## 2024-04-09 ENCOUNTER — OFFICE VISIT (OUTPATIENT)
Dept: FAMILY MEDICINE | Facility: CLINIC | Age: 84
End: 2024-04-09
Payer: COMMERCIAL

## 2024-04-09 VITALS
SYSTOLIC BLOOD PRESSURE: 98 MMHG | OXYGEN SATURATION: 96 % | RESPIRATION RATE: 20 BRPM | WEIGHT: 187.6 LBS | TEMPERATURE: 98.6 F | BODY MASS INDEX: 28.52 KG/M2 | DIASTOLIC BLOOD PRESSURE: 58 MMHG | HEART RATE: 75 BPM

## 2024-04-09 DIAGNOSIS — Z29.11 NEED FOR VACCINATION AGAINST RESPIRATORY SYNCYTIAL VIRUS: ICD-10-CM

## 2024-04-09 DIAGNOSIS — R33.9 INCOMPLETE BLADDER EMPTYING: ICD-10-CM

## 2024-04-09 DIAGNOSIS — M25.50 POLYARTHRALGIA: ICD-10-CM

## 2024-04-09 DIAGNOSIS — M89.671: ICD-10-CM

## 2024-04-09 DIAGNOSIS — E78.49 OTHER HYPERLIPIDEMIA: ICD-10-CM

## 2024-04-09 DIAGNOSIS — Z23 ENCOUNTER FOR IMMUNIZATION: ICD-10-CM

## 2024-04-09 DIAGNOSIS — B91: ICD-10-CM

## 2024-04-09 DIAGNOSIS — Z00.00 ENCOUNTER FOR MEDICARE ANNUAL WELLNESS EXAM: Primary | ICD-10-CM

## 2024-04-09 DIAGNOSIS — M18.11 PRIMARY OSTEOARTHRITIS OF FIRST CARPOMETACARPAL JOINT OF RIGHT HAND: ICD-10-CM

## 2024-04-09 DIAGNOSIS — Z23 NEED FOR SHINGLES VACCINE: ICD-10-CM

## 2024-04-09 DIAGNOSIS — Z95.5 HISTORY OF HEART ARTERY STENT: ICD-10-CM

## 2024-04-09 DIAGNOSIS — I10 ESSENTIAL HYPERTENSION, BENIGN: ICD-10-CM

## 2024-04-09 DIAGNOSIS — I25.2 HISTORY OF MI (MYOCARDIAL INFARCTION): ICD-10-CM

## 2024-04-09 DIAGNOSIS — Z85.828 HISTORY OF BASAL CELL CARCINOMA: ICD-10-CM

## 2024-04-09 DIAGNOSIS — I25.83 CORONARY ATHEROSCLEROSIS DUE TO LIPID RICH PLAQUE: ICD-10-CM

## 2024-04-09 DIAGNOSIS — Z12.5 SCREENING FOR PROSTATE CANCER: ICD-10-CM

## 2024-04-09 LAB
ALBUMIN SERPL BCG-MCNC: 4.1 G/DL (ref 3.5–5.2)
ALP SERPL-CCNC: 62 U/L (ref 40–150)
ALT SERPL W P-5'-P-CCNC: 32 U/L (ref 0–70)
ANION GAP SERPL CALCULATED.3IONS-SCNC: 10 MMOL/L (ref 7–15)
AST SERPL W P-5'-P-CCNC: 28 U/L (ref 0–45)
BILIRUB SERPL-MCNC: 0.7 MG/DL
BUN SERPL-MCNC: 16.2 MG/DL (ref 8–23)
CALCIUM SERPL-MCNC: 9.7 MG/DL (ref 8.8–10.2)
CHLORIDE SERPL-SCNC: 105 MMOL/L (ref 98–107)
CHOLEST SERPL-MCNC: 111 MG/DL
CREAT SERPL-MCNC: 0.72 MG/DL (ref 0.67–1.17)
DEPRECATED HCO3 PLAS-SCNC: 28 MMOL/L (ref 22–29)
EGFRCR SERPLBLD CKD-EPI 2021: >90 ML/MIN/1.73M2
FASTING STATUS PATIENT QL REPORTED: NORMAL
GLUCOSE SERPL-MCNC: 83 MG/DL (ref 70–99)
HDLC SERPL-MCNC: 44 MG/DL
HGB BLD-MCNC: 15.3 G/DL (ref 13.3–17.7)
LDLC SERPL CALC-MCNC: 41 MG/DL
NONHDLC SERPL-MCNC: 67 MG/DL
POTASSIUM SERPL-SCNC: 4.5 MMOL/L (ref 3.4–5.3)
PROT SERPL-MCNC: 6.4 G/DL (ref 6.4–8.3)
PSA SERPL DL<=0.01 NG/ML-MCNC: 1.05 NG/ML
SODIUM SERPL-SCNC: 143 MMOL/L (ref 135–145)
TRIGL SERPL-MCNC: 128 MG/DL

## 2024-04-09 PROCEDURE — 90662 IIV NO PRSV INCREASED AG IM: CPT | Mod: GZ | Performed by: STUDENT IN AN ORGANIZED HEALTH CARE EDUCATION/TRAINING PROGRAM

## 2024-04-09 PROCEDURE — 85018 HEMOGLOBIN: CPT | Performed by: STUDENT IN AN ORGANIZED HEALTH CARE EDUCATION/TRAINING PROGRAM

## 2024-04-09 PROCEDURE — 91320 SARSCV2 VAC 30MCG TRS-SUC IM: CPT | Performed by: STUDENT IN AN ORGANIZED HEALTH CARE EDUCATION/TRAINING PROGRAM

## 2024-04-09 PROCEDURE — 80061 LIPID PANEL: CPT | Performed by: STUDENT IN AN ORGANIZED HEALTH CARE EDUCATION/TRAINING PROGRAM

## 2024-04-09 PROCEDURE — 99214 OFFICE O/P EST MOD 30 MIN: CPT | Mod: 25 | Performed by: STUDENT IN AN ORGANIZED HEALTH CARE EDUCATION/TRAINING PROGRAM

## 2024-04-09 PROCEDURE — G0103 PSA SCREENING: HCPCS | Performed by: STUDENT IN AN ORGANIZED HEALTH CARE EDUCATION/TRAINING PROGRAM

## 2024-04-09 PROCEDURE — G0008 ADMIN INFLUENZA VIRUS VAC: HCPCS | Mod: GZ | Performed by: STUDENT IN AN ORGANIZED HEALTH CARE EDUCATION/TRAINING PROGRAM

## 2024-04-09 PROCEDURE — G0439 PPPS, SUBSEQ VISIT: HCPCS | Performed by: STUDENT IN AN ORGANIZED HEALTH CARE EDUCATION/TRAINING PROGRAM

## 2024-04-09 PROCEDURE — 90480 ADMN SARSCOV2 VAC 1/ONLY CMP: CPT | Performed by: STUDENT IN AN ORGANIZED HEALTH CARE EDUCATION/TRAINING PROGRAM

## 2024-04-09 PROCEDURE — 36415 COLL VENOUS BLD VENIPUNCTURE: CPT | Performed by: STUDENT IN AN ORGANIZED HEALTH CARE EDUCATION/TRAINING PROGRAM

## 2024-04-09 PROCEDURE — 80053 COMPREHEN METABOLIC PANEL: CPT | Performed by: STUDENT IN AN ORGANIZED HEALTH CARE EDUCATION/TRAINING PROGRAM

## 2024-04-09 RX ORDER — METOPROLOL SUCCINATE 50 MG/1
50 TABLET, EXTENDED RELEASE ORAL DAILY
Qty: 90 TABLET | Refills: 3 | Status: SHIPPED | OUTPATIENT
Start: 2024-04-09

## 2024-04-09 RX ORDER — LOSARTAN POTASSIUM 50 MG/1
50 TABLET ORAL DAILY
Qty: 90 TABLET | Refills: 3 | Status: SHIPPED | OUTPATIENT
Start: 2024-04-09

## 2024-04-09 RX ORDER — TAMSULOSIN HYDROCHLORIDE 0.4 MG/1
0.4 CAPSULE ORAL DAILY
Qty: 90 CAPSULE | Refills: 3 | Status: SHIPPED | OUTPATIENT
Start: 2024-04-09

## 2024-04-09 RX ORDER — RESPIRATORY SYNCYTIAL VIRUS VACCINE 120MCG/0.5
0.5 KIT INTRAMUSCULAR ONCE
Qty: 1 EACH | Refills: 0 | Status: CANCELLED | OUTPATIENT
Start: 2024-04-09 | End: 2024-04-09

## 2024-04-09 RX ORDER — ATORVASTATIN CALCIUM 20 MG/1
20 TABLET, FILM COATED ORAL DAILY
Qty: 90 TABLET | Refills: 3 | Status: SHIPPED | OUTPATIENT
Start: 2024-04-09

## 2024-04-09 RX ORDER — DULOXETIN HYDROCHLORIDE 30 MG/1
30 CAPSULE, DELAYED RELEASE ORAL 2 TIMES DAILY
Qty: 180 CAPSULE | Refills: 0 | Status: SHIPPED | OUTPATIENT
Start: 2024-04-09 | End: 2024-08-15

## 2024-04-09 ASSESSMENT — PAIN SCALES - GENERAL: PAINLEVEL: MODERATE PAIN (4)

## 2024-04-09 NOTE — LETTER
April 16, 2024      Silvano Cruz  [address] [place] [address]        Dear ,    We are writing to inform you of your test results.    Bloodwork looks great. Will see you at our follow up.     Resulted Orders   Lipid panel reflex to direct LDL Non-fasting   Result Value Ref Range    Cholesterol 111 <200 mg/dL    Triglycerides 128 <150 mg/dL    Direct Measure HDL 44 >=40 mg/dL    LDL Cholesterol Calculated 41 <=100 mg/dL    Non HDL Cholesterol 67 <130 mg/dL    Patient Fasting > 8hrs? Unknown     Narrative    Cholesterol  Desirable:  <200 mg/dL    Triglycerides  Normal:  Less than 150 mg/dL  Borderline High:  150-199 mg/dL  High:  200-499 mg/dL  Very High:  Greater than or equal to 500 mg/dL    Direct Measure HDL  Female:  Greater than or equal to 50 mg/dL   Male:  Greater than or equal to 40 mg/dL    LDL Cholesterol  Desirable:  <100mg/dL  Above Desirable:  100-129 mg/dL   Borderline High:  130-159 mg/dL   High:  160-189 mg/dL   Very High:  >= 190 mg/dL    Non HDL Cholesterol  Desirable:  130 mg/dL  Above Desirable:  130-159 mg/dL  Borderline High:  160-189 mg/dL  High:  190-219 mg/dL  Very High:  Greater than or equal to 220 mg/dL   Comprehensive metabolic panel (BMP + Alb, Alk Phos, ALT, AST, Total. Bili, TP)   Result Value Ref Range    Sodium 143 135 - 145 mmol/L      Comment:      Reference intervals for this test were updated on 09/26/2023 to more accurately reflect our healthy population. There may be differences in the flagging of prior results with similar values performed with this method. Interpretation of those prior results can be made in the context of the updated reference intervals.     Potassium 4.5 3.4 - 5.3 mmol/L    Carbon Dioxide (CO2) 28 22 - 29 mmol/L    Anion Gap 10 7 - 15 mmol/L    Urea Nitrogen 16.2 8.0 - 23.0 mg/dL    Creatinine 0.72 0.67 - 1.17 mg/dL    GFR Estimate >90 >60 mL/min/1.73m2    Calcium 9.7 8.8 - 10.2 mg/dL    Chloride 105 98 - 107 mmol/L    Glucose 83 70 -  99 mg/dL    Alkaline Phosphatase 62 40 - 150 U/L      Comment:      Reference intervals for this test were updated on 11/14/2023 to more accurately reflect our healthy population. There may be differences in the flagging of prior results with similar values performed with this method. Interpretation of those prior results can be made in the context of the updated reference intervals.    AST 28 0 - 45 U/L      Comment:      Reference intervals for this test were updated on 6/12/2023 to more accurately reflect our healthy population. There may be differences in the flagging of prior results with similar values performed with this method. Interpretation of those prior results can be made in the context of the updated reference intervals.    ALT 32 0 - 70 U/L      Comment:      Reference intervals for this test were updated on 6/12/2023 to more accurately reflect our healthy population. There may be differences in the flagging of prior results with similar values performed with this method. Interpretation of those prior results can be made in the context of the updated reference intervals.      Protein Total 6.4 6.4 - 8.3 g/dL    Albumin 4.1 3.5 - 5.2 g/dL    Bilirubin Total 0.7 <=1.2 mg/dL   Hemoglobin   Result Value Ref Range    Hemoglobin 15.3 13.3 - 17.7 g/dL   PSA, screen   Result Value Ref Range    Prostate Specific Antigen Screen 1.05 ng/mL      Comment:      No reference ranges have been established for patients over 80 years.    Narrative    This result is obtained using the Roche Elecsys total PSA method on the jeanne [id number] immunoassay analyzer. Results obtained with different assay methods or kits cannot be used interchangeably.       If you have any questions or concerns, please call the clinic at the number listed above.       Sincerely,      Jennyfer Kendall, DO

## 2024-04-09 NOTE — PATIENT INSTRUCTIONS
Preventive Care Advice   This is general advice given by our system to help you stay healthy. However, your care team may have specific advice just for you. Please talk to your care team about your preventive care needs.  Nutrition  Eat 5 or more servings of fruits and vegetables each day.  Try wheat bread, brown rice and whole grain pasta (instead of white bread, rice, and pasta).  Get enough calcium and vitamin D. Check the label on foods and aim for 100% of the RDA (recommended daily allowance).  Lifestyle  Exercise at least 150 minutes each week   (30 minutes a day, 5 days a week).  Do muscle strengthening activities 2 days a week. These help control your weight and prevent disease.  No smoking.  Wear sunscreen to prevent skin cancer.  Have a dental exam and cleaning every 6 months.  Yearly exams  See your health care team every year to talk about:  Any changes in your health.  Any medicines your care team has prescribed.  Preventive care, family planning, and ways to prevent chronic diseases.  Shots (vaccines)   HPV shots (up to age 26), if you've never had them before.  Hepatitis B shots (up to age 59), if you've never had them before.  COVID-19 shot: Get this shot when it's due.  Flu shot: Get a flu shot every year.  Tetanus shot: Get a tetanus shot every 10 years.  Pneumococcal, hepatitis A, and RSV shots: Ask your care team if you need these based on your risk.  Shingles shot (for age 50 and up).  General health tests  Diabetes screening:  Starting at age 35, Get screened for diabetes at least every 3 years.  If you are younger than age 35, ask your care team if you should be screened for diabetes.  Cholesterol test: At age 39, start having a cholesterol test every 5 years, or more often if advised.  Bone density scan (DEXA): At age 50, ask your care team if you should have this scan for osteoporosis (brittle bones).  Hepatitis C: Get tested at least once in your life.  STIs (sexually transmitted  infections)  Before age 24: Ask your care team if you should be screened for STIs.  After age 24: Get screened for STIs if you're at risk. You are at risk for STIs (including HIV) if:  You are sexually active with more than one person.  You don't use condoms every time.  You or a partner was diagnosed with a sexually transmitted infection.  If you are at risk for HIV, ask about PrEP medicine to prevent HIV.  Get tested for HIV at least once in your life, whether you are at risk for HIV or not.  Cancer screening tests  Cervical cancer screening: If you have a cervix, begin getting regular cervical cancer screening tests at age 21. Most people who have regular screenings with normal results can stop after age 65. Talk about this with your provider.  Breast cancer scan (mammogram): If you've ever had breasts, begin having regular mammograms starting at age 40. This is a scan to check for breast cancer.  Colon cancer screening: It is important to start screening for colon cancer at age 45.  Have a colonoscopy test every 10 years (or more often if you're at risk) Or, ask your provider about stool tests like a FIT test every year or Cologuard test every 3 years.  To learn more about your testing options, visit: https://www.iSirona/086663.pdf.  For help making a decision, visit: https://bit.ly/kl20281.  Prostate cancer screening test: If you have a prostate and are age 55 to 69, ask your provider if you would benefit from a yearly prostate cancer screening test.  Lung cancer screening: If you are a current or former smoker age 50 to 80, ask your care team if ongoing lung cancer screenings are right for you.  For informational purposes only. Not to replace the advice of your health care provider. Copyright   2023 [employer]. All rights reserved. Clinically reviewed by the [facility]. [employer] 075898 - REV 01/24.    Learning About Activities of Daily Living  What are activities  of daily living?     Activities of daily living (ADLs) are the basic self-care tasks you do every day. These include eating, bathing, dressing, and moving around.  As you age, and if you have health problems, you may find that it's harder to do some of these tasks. If so, your doctor can suggest ideas that may help.  To measure what kind of help you may need, your doctor will ask how well you are able to do ADLs. Let your doctor know if there are any tasks that you are having trouble doing. This is an important first step to getting help. And when you have the help you need, you can stay as independent as possible.  How will a doctor assess your ADLs?  Asking about ADLs is part of a routine health checkup your doctor will likely do as you age. Your health check might be done in a doctor's office, in your home, or at a hospital. The goal is to find out if you are having any problems that could make it hard to care for yourself or that make it unsafe for you to be on your own.  To measure your ADLs, your doctor will ask how hard it is for you to do routine tasks. Your doctor may also want to know if you have changed the way you do a task because of a health problem. Your doctor may watch how you:  Walk back and forth.  Keep your balance while you stand or walk.  Move from sitting to standing or from a bed to a chair.  Button or unbutton a shirt or sweater.  Remove and put on your shoes.  It's common to feel a little worried or anxious if you find you can't do all the things you used to be able to do. Talking with your doctor about ADLs is a way to make sure you're as safe as possible and able to care for yourself as well as you can. You may want to bring a caregiver, friend, or family member to your checkup. They can help you talk to your doctor.  Follow-up care is a key part of your treatment and safety. Be sure to make and go to all appointments, and call your doctor if you are having problems. It's also a good idea  to know your test results and keep a list of the medicines you take.  Current as of: October 24, 2023               Content Version: 14.0    6118-6590 [employer].   Care instructions adapted under license by your healthcare professional. If you have questions about a medical condition or this instruction, always ask your healthcare professional. [employer] disclaims any warranty or liability for your use of this information.      Preventing Falls: Care Instructions  Injuries and health problems such as trouble walking or poor eyesight can increase your risk of falling. So can some medicines. But there are things you can do to help prevent falls. You can exercise to get stronger. You can also arrange your home to make it safer.    Talk to your doctor about the medicines you take. Ask if any of them increase the risk of falls and whether they can be changed or stopped.   Try to exercise regularly. It can help improve your strength and balance. This can help lower your risk of falling.     Practice fall safety and prevention.    Wear low-heeled shoes that fit well and give your feet good support. Talk to your doctor if you have foot problems that make this hard.  Carry a cellphone or wear a medical alert device that you can use to call for help.  Use stepladders instead of chairs to reach high objects. Don't climb if you're at risk for falls. Ask for help, if needed.  Wear the correct eyeglasses, if you need them.    Make your home safer.    Remove rugs, cords, clutter, and furniture from walkways.  Keep your house well lit. Use night-lights in hallways and bathrooms.  Install and use sturdy handrails on stairways.  Wear nonskid footwear, even inside. Don't walk barefoot or in socks without shoes.    Be safe outside.    Use handrails, curb cuts, and ramps whenever possible.  Keep your hands free by using a shoulder bag or backpack.  Try to walk in well-lit areas. Watch out for uneven ground,  "changes in pavement, and debris.  Be careful in the winter. Walk on the grass or gravel when sidewalks are slippery. Use de-icer on steps and walkways. Add non-slip devices to shoes.    Put grab bars and nonskid mats in your shower or tub and near the toilet. Try to use a shower chair or bath bench when bathing.   Get into a tub or shower by putting in your weaker leg first. Get out with your strong side first. Have a phone or medical alert device in the bathroom with you.   Where can you learn more?  Go to https://www.[employer].net/patiented  Enter G117 in the search box to learn more about \"Preventing Falls: Care Instructions.\"  Current as of: July 17, 2023               Content Version: 14.0    0997-2448 [employer].   Care instructions adapted under license by your healthcare professional. If you have questions about a medical condition or this instruction, always ask your healthcare professional. [employer] disclaims any warranty or liability for your use of this information.      Substance Use Disorder: Care Instructions  Overview     You can improve your life and health by stopping your use of alcohol or drugs. When you don't drink or use drugs, you may feel and sleep better. You may get along better with your family, friends, and coworkers. There are medicines and programs that can help with substance use disorder.  How can you care for yourself at home?  Here are some ways to help you stay sober and prevent relapse.  If you have been given medicine to help keep you sober or reduce your cravings, be sure to take it exactly as prescribed.  Talk to your doctor about programs that can help you stop using drugs or drinking alcohol.  Do not keep alcohol or drugs in your home.  Plan ahead. Think about what you'll say if other people ask you to drink or use drugs. Try not to spend time with people who drink or use drugs.  Use the time and money spent on drinking or drugs to do something " that's important to you.  Preventing a relapse  Have a plan to deal with relapse. Learn to recognize changes in your thinking that lead you to drink or use drugs. Get help before you start to drink or use drugs again.  Try to stay away from situations, friends, or places that may lead you to drink or use drugs.  If you feel the need to drink alcohol or use drugs again, seek help right away. Call a trusted friend or family member. Some people get support from organizations such as Narcotics Anonymous or [employer] or from treatment facilities.  If you relapse, get help as soon as you can. Some people make a plan with another person that outlines what they want that person to do for them if they relapse. The plan usually includes how to handle the relapse and who to notify in case of relapse.  Don't give up. Remember that a relapse doesn't mean that you have failed. Use the experience to learn the triggers that lead you to drink or use drugs. Then quit again. Recovery is a lifelong process. Many people have several relapses before they are able to quit for good.  Follow-up care is a key part of your treatment and safety. Be sure to make and go to all appointments, and call your doctor if you are having problems. It's also a good idea to know your test results and keep a list of the medicines you take.  When should you call for help?   Call 911  anytime you think you may need emergency care. For example, call if you or someone else:    Has overdosed or has withdrawal signs. Be sure to tell the emergency workers that you are or someone else is using or trying to quit using drugs. Overdose or withdrawal signs may include:  Losing consciousness.  Seizure.  Seeing or hearing things that aren't there (hallucinations).     Is thinking or talking about suicide or harming others.   Where to get help 24 hours a day, 7 days a week   If you or someone you know talks about suicide, self-harm, a mental health crisis, a  "substance use crisis, or any other kind of emotional distress, get help right away. You can:    Call the Suicide and Crisis Lifeline at 988.     Call 0-920-065-FYML (1-458.832.3153).     Text HOME to 151706 to access the Crisis Text Line.   Consider saving these numbers in your phone.  Go to [employer] for more information or to chat online.  Call your doctor now or seek immediate medical care if:    You are having withdrawal symptoms. These may include nausea or vomiting, sweating, shakiness, and anxiety.   Watch closely for changes in your health, and be sure to contact your doctor if:    You have a relapse.     You need more help or support to stop.   Where can you learn more?  Go to https://www.[employer].net/patiented  Enter H573 in the search box to learn more about \"Substance Use Disorder: Care Instructions.\"  Current as of: November 15, 2023               Content Version: 14.0    6431-4968 [employer].   Care instructions adapted under license by your healthcare professional. If you have questions about a medical condition or this instruction, always ask your healthcare professional. [employer] disclaims any warranty or liability for your use of this information.      "

## 2024-04-09 NOTE — PROGRESS NOTES
Pre[facility] Care Visit  [facility]  Apr 9, 2024      Assessment & Plan      Diagnosis Comments   1. Encounter for Medicare annual wellness exam        2. Other hyperlipidemia  atorvastatin (LIPITOR) 20 MG tablet       3. Essential hypertension, benign  losartan (COZAAR) 50 MG tablet, metoprolol succinate ER (TOPROL XL) 50 MG 24 hr tablet, Comprehensive metabolic panel (BMP + Alb, Alk Phos, ALT, AST, Total. Bili, TP), Hemoglobin       4. Incomplete bladder emptying  tamsulosin (FLOMAX) 0.4 MG capsule       5. Need for shingles vaccine        6. Need for vaccination against respiratory syncytial virus        7. Coronary atherosclerosis due to lipid rich plaque  Lipid panel reflex to direct LDL Non-fasting       8. Primary osteoarthritis of first carpometacarpal joint of right hand  DULoxetine (CYMBALTA) 30 MG capsule       9. Polyarthralgia  DULoxetine (CYMBALTA) 30 MG capsule       10. Screening for prostate cancer  PSA, screen       11. Encounter for immunization  INFLUENZA VACCINE 65+ (FLUZONE HD), COVID-19 12+ (2023-24) (PFIZER)       12. 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 History of MI (myocardial infarction)        14. History of heart artery stent        15. History of basal cell carcinoma            Patient is a pleasant 82-year-old male with left leg poliomyelitis osteopathy, SCC of the face, brachial plexopathy, essential hypertension, who presents today for an annual physical.     Home life: Lives with wife and kids.  Occupation: Retired  Sexually active: No  Safety concerns: None identified  Diet/exercise: Significantly limited due to his polymyositis.  Tries to stay as active as he can and work on a daily basis.  Family history of cancers: father had lung cancer ( smoker) , brother has barretts   Eye exam/dental exam: UTD  Alcohol use: daily, 1 drink   Tobacco use/marijuana use/drug use:no   Mental health:  Stable  Vaccines: Will get shingles vaccine at the pharmacy.  Flu and COVID-vaccine given today.   blood work: Ordered                   Colon cancer screening:last in 11/2022 --> (+) tubular adenoma without any high grade dysplasia. No clear return date on  Report. Per patient, return in 3  Years.   PSA: Ordered     IADLs/ADLs intact: Slightly impaired due to right leg polymyositis but is able to do most of his ADLs.    Driving: yes   Memory:no issues   Constipation, vision, hearing concerns: no concerns   Falls over the last year: (+) near falls 2/2 poliomyelitis.      History of facial BCC:  Follows with [facility] and has an upcoming appointment with them.  He does have a lesion on the left side of his scalp that he needs to see them for.  He is unsure how long the lesion has been there for but it is not healing.  On exam, it does appear slightly suspicious.  Advised him to call dermatology and move up the appointment.  If he is unable to, he can let me know and we can biopsy it for him.     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  Is s/p carpal tunnel release on the right hand with improvement in numbness.  However, he notes he still he has severe CMC arthritis in both hands and stiffness.  This causes some level of chronic pain for him and affects his grasp strength.  Patient is frustrated by the pain as well as bothered by the pain.  He is not sure if he wants to go to surgery quite yet.  Plan:  - Patient has polyarthralgias in the setting of arthritis as well as right leg poliomyelitis.  Given level of chronic pain, he may be someone who benefits from duloxetine.  Patient is amenable to trying.  Reviewed side effect profile of the duloxetine including worsening anxiety, depression, potential SI, GI side effects, dry mouth, headaches and dizziness.  Symptoms should subside by week 3 of being on the medication.  If patient has persistent SI, he should  "discontinue the medication.  - He can also trial Voltaren gel for his hands  - I will see him back in 2 to 3 months for follow-up.    Right leg poliomyelitis:  Had polio as a child and uses walking sticks at baseline.  Has his left ankle to use.  Has right knee replaced and ankle fusion  Has muscle atrophy of his lower extremities bilaterally  Symptoms are continuing to progress slowly.  Tries to stay as active as possible and do his PT exercises.  Has recently discovered seated yoga and that has helped immensely  Plan:  - Continue to stay active  - We will start on duloxetine as above for his pain     History of incomplete bladder emptying:  PSA normal last year.  No new symptoms.  Is on Flomax with good effect.  Is stable with no acute progression and no gross hematuria  Plan:  - Recheck PSA     Chronic cough:  Has had it for many years.  Stable with avoidance of this.     Right-sided brachial plexopathy   is s/p PT with improvement of his range of motion  Continues to follow with neuro breath and lost to follow-up  Notes that the pain and symptoms are a bit worse since last time I saw him.  He feels like there is \"a piece of wood in his right upper arm due to lack of/decree sensation in the area.  He is not sure if he wants to pursue physical therapy at this point or see PMNR for this.  He would like to see how the duloxetine works first.     History of MI s/p PCI:  Currently on aspirin +  moderate intensity statin.  Will recheck lipids today     Floaters in the eyes and s/p laser treatment  Is better and following eye doc   No changes in visual acuity or eye pain       Patient has been advised of split billing requirements and indicates understanding: Yes         35 minutes in addition to the annual preventative spent on the date of the encounter doing chart review, history and exam, documentation and further activities per the note      Counseling  Appropriate preventive services were discussed with this " patient, including applicable screening as appropriate for fall prevention, nutrition, physical activity, Tobacco-use cessation, weight loss and cognition.  Checklist reviewing preventive services available has been given to the patient.  Reviewed patient's diet, addressing concerns and/or questions.   The patient was instructed to see the dentist every 6 months.   Updated plan of care.  Patient reported difficulty with activities of daily living were addressed today.      Finesse Schaefer is a 83 year old, presenting for the following:  Wellness Visit (--not fasting today)        4/9/2024    11:21 AM   Additional Questions   Roomed by Reyna CARLIN CMA   Accompanied by self         4/9/2024    11:21 AM   Patient Reported Additional Medications   Patient reports taking the following new medications na         Health Care Directive  Patient does not have a Health Care Directive or Living Will: Discussed advance care planning with patient; information given to patient to review.    HPI        4/8/2024   General Health   How would you rate your overall physical health? Good   Feel stress (tense, anxious, or unable to sleep) Not at all         4/8/2024   Nutrition   Diet: Regular (no restrictions)         4/8/2024   Exercise   Days per week of moderate/strenous exercise Patient declined   Average minutes spent exercising at this level Patient declined         4/8/2024   Social Factors   Frequency of gathering with friends or relatives Once a week   Worry food won't last until get money to buy more No   Food not last or not have enough money for food? No   Do you have housing?  Yes   Are you worried about losing your housing? No   Lack of transportation? No   Unable to get utilities (heat,electricity)? No         4/9/2024   Fall Risk   Fallen 2 or more times in the past year? No   Trouble with walking or balance? Yes   Reason Gait Speed Test Not Completed Patient declines          4/8/2024   Activities of Daily Living- Home  Safety   Needs help with the following daily activites Shopping   Safety concerns in the home None of the above         4/8/2024   Dental   Dentist two times every year? (!) NO         4/8/2024   Hearing Screening   Hearing concerns? None of the above         4/8/2024   Driving Risk Screening   Patient/family members have concerns about driving No         4/8/2024   General Alertness/Fatigue Screening   Have you been more tired than usual lately? No         4/8/2024   Urinary Incontinence Screening   Bothered by leaking urine in past 6 months No         4/8/2024   TB Screening   Were you born outside of the [place]? No         Today's PHQ-2 Score:       4/8/2024     3:49 PM   PHQ-2 ( 1999 Pf[employer])   Q1: Little interest or pleasure in doing things 0   Q2: Feeling down, depressed or hopeless 0   PHQ-2 Score 0   Q1: Little interest or pleasure in doing things Not at all   Q2: Feeling down, depressed or hopeless Not at all   PHQ-2 Score 0           4/8/2024   Substance Use   Alcohol more than 3/day or more than 7/wk No   Do you have a current opioid prescription? No   How severe/bad is pain from 1 to 10? 6/10   Do you use any other substances recreationally? (!) ALCOHOL     Social History     Tobacco Use    Smoking status: Never     Passive exposure: Never    Smokeless tobacco: Never   Vaping Use    Vaping Use: Never used   Substance Use Topics    Alcohol use: Yes     Comment: Alcoholic Drinks/day: alittle    Drug use: Never             Reviewed and updated as needed this visit by Provider   Tobacco  Allergies  Meds  Problems  Med Hx  Surg Hx  Fam Hx            Past Medical History:   Diagnosis Date    Coronary artery disease     Polio     Stented coronary artery      Past Surgical History:   Procedure Laterality Date    ARTHRODESIS ANKLE Right 1/14/2015    Procedure: ARTHRODESIS ANKLE;  Surgeon: Sundar Chavez MD;  [facility]    ARTHRODESIS ANKLE Bilateral Right 2015, Left at age 15    ARTHROPLASTY KNEE       Right    CARDIAC SURGERY      LAD stent    CATARACT IOL, RT/LT      COLONOSCOPY N/A 11/7/2022    Procedure: COLONOSCOPY with cold polypectomies;  Surgeon: Jaycob Crane MD;  Location: [facility]    ENT SURGERY      tonsillectomy 9 yo    IR LUMBAR EPIDURAL STEROID INJECTION  11/3/2009    IR LUMBAR EPIDURAL STEROID INJECTION  3/9/2010    ORTHOPEDIC SURGERY      left foot arthrodesis when 16 yo    ORTHOPEDIC SURGERY      right total knee    TOTAL KNEE ARTHROPLASTY Right 2009     OB History   No obstetric history on file.     Lab work is in process  Labs reviewed in EPIC  BP Readings from Last 3 Encounters:   04/09/24 98/58   01/24/23 106/60   11/07/22 116/64    Wt Readings from Last 3 Encounters:   04/09/24 85.1 kg (187 lb 9.6 oz)   01/24/23 84.9 kg (187 lb 3.2 oz)   11/07/22 84.8 kg (187 lb)                  Patient Active Problem List   Diagnosis    Pain in joint involving ankle and foot    Impaired gait    Benign essential hypertension    Brachial plexopathy    Coronary atherosclerosis    History of heart artery stent    History of poliomyelitis    Hyperlipidemia    Incomplete bladder emptying    Osteoarthrosis    Scoliosis    Seborrheic keratosis    Difficulty walking    Squamous cell carcinoma of skin of face    Right leg weakness    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)    Benign neoplasm of ascending colon    Diverticular disease of large intestine    History of colonic polyps    History of MI (myocardial infarction)     Past Surgical History:   Procedure Laterality Date    ARTHRODESIS ANKLE Right 1/14/2015    Procedure: ARTHRODESIS ANKLE;  Surgeon: Sundar Chavez MD;  [facility]    ARTHRODESIS ANKLE Bilateral Right 2015, Left at age 15    ARTHROPLASTY KNEE      Right    CARDIAC SURGERY      LAD stent    CATARACT IOL, RT/LT      COLONOSCOPY N/A 11/7/2022    Procedure: COLONOSCOPY with cold polypectomies;  Surgeon: Jaycob Crane MD;  Location: [facility]     ENT SURGERY      tonsillectomy 7 yo    IR LUMBAR EPIDURAL STEROID INJECTION  11/3/2009    IR LUMBAR EPIDURAL STEROID INJECTION  3/9/2010    ORTHOPEDIC SURGERY      left foot arthrodesis when 14 yo    ORTHOPEDIC SURGERY      right total knee    TOTAL KNEE ARTHROPLASTY Right 2009       Social History     Tobacco Use    Smoking status: Never     Passive exposure: Never    Smokeless tobacco: Never   Substance Use Topics    Alcohol use: Yes     Comment: Alcoholic Drinks/day: alittle     Family History   Problem Relation Age of Onset    Cerebrovascular Disease Mother     Lung Cancer Father     Harley's esophagus Father     Diabetes No family hx of     Glaucoma No family hx of     Macular Degeneration No family hx of          Current Outpatient Medications   Medication Sig Dispense Refill    aspirin 81 MG tablet Take 81 mg by mouth daily      atorvastatin (LIPITOR) 20 MG tablet Take 1 tablet (20 mg) by mouth daily 90 tablet 3    DULoxetine (CYMBALTA) 30 MG capsule Take 1 capsule (30 mg) by mouth 2 times daily 180 capsule 0    losartan (COZAAR) 50 MG tablet Take 1 tablet (50 mg) by mouth daily 90 tablet 3    metoprolol succinate ER (TOPROL XL) 50 MG 24 hr tablet Take 1 tablet (50 mg) by mouth daily 90 tablet 3    tamsulosin (FLOMAX) 0.4 MG capsule Take 1 capsule (0.4 mg) by mouth daily 90 capsule 3     Allergies   Allergen Reactions    Strawberry Extract Hives     Current providers sharing in care for this patient include:  Patient Care Team:  Jennyfer Kendall DO as PCP - General (Family Medicine)  Sundar Chavez MD as MD (Orthopedics)  Favian Griffith DPM (Podiatry)  Jennyfer Kendall DO as Assigned PCP    The following health maintenance items are reviewed in Epic and correct as of today:  Health Maintenance   Topic Date Due    ZOSTER IMMUNIZATION (1 of 2) Never done    RSV VACCINE (Pregnancy & 60+) (1 - 1-dose 60+ series) Never done    MEDICARE ANNUAL WELLNESS VISIT  04/09/2025    LIPID  04/09/2025     "ANNUAL REVIEW OF  ORDERS  04/09/2025    FALL RISK ASSESSMENT  04/09/2025    ADVANCE CARE PLANNING  04/09/2029    COLORECTAL CANCER SCREENING  11/07/2029    DTAP/TDAP/TD IMMUNIZATION (5 - Td or Tdap) 10/05/2032    PHQ-2 (once per calendar year)  Completed    INFLUENZA VACCINE  Completed    Pneumococcal Vaccine: 65+ Years  Completed    COVID-19 Vaccine  Completed    IPV IMMUNIZATION  Aged Out    HPV IMMUNIZATION  Aged Out    MENINGITIS IMMUNIZATION  Aged Out    RSV MONOCLONAL ANTIBODY  Aged Out         Review of Systems  Constitutional, neuro, ENT, endocrine, pulmonary, cardiac, gastrointestinal, genitourinary, musculoskeletal, integument and psychiatric systems are negative, except as otherwise noted.     Objective    Exam  BP 98/58 (BP Location: Right arm, Patient Position: Sitting, Cuff Size: Adult Large)   Pulse 75   Temp 98.6  F (37  C) (Oral)   Resp 20   Wt 85.1 kg (187 lb 9.6 oz)   SpO2 96%   BMI 28.52 kg/m     Estimated body mass index is 28.52 kg/m  as calculated from the following:    Height as of 1/24/23: 1.727 m (5' 8\").    Weight as of this encounter: 85.1 kg (187 lb 9.6 oz).    Physical Exam  GENERAL: alert and no distress  NECK: no adenopathy, no asymmetry, masses, or scars  RESP: lungs clear to auscultation - no rales, rhonchi or wheezes  CV: regular rate and rhythm,no murmur, click or rub, no peripheral edema  MS: no gross musculoskeletal defects noted, no edema  Uses walker at baseline  Has right leg brace  Has osteoarthritis related nodules on his hands and thumb stiffness bilaterally  Tenderness to palpation of the CMC joint bilaterally  PSYCH: mentation appears normal, affect normal/bright         4/9/2024   Mini Cog   Clock Draw Score 2 Normal   3 Item Recall 3 objects recalled   Mini Cog Total Score 5              Signed Electronically by: Jennyfer Kendall DO"

## 2024-06-11 ENCOUNTER — OFFICE VISIT (OUTPATIENT)
Dept: FAMILY MEDICINE | Facility: CLINIC | Age: 84
End: 2024-06-11
Payer: COMMERCIAL

## 2024-06-11 VITALS
RESPIRATION RATE: 20 BRPM | BODY MASS INDEX: 27.22 KG/M2 | DIASTOLIC BLOOD PRESSURE: 58 MMHG | HEIGHT: 68 IN | SYSTOLIC BLOOD PRESSURE: 92 MMHG | TEMPERATURE: 97.9 F | HEART RATE: 81 BPM | WEIGHT: 179.6 LBS | OXYGEN SATURATION: 94 %

## 2024-06-11 DIAGNOSIS — B91: Primary | ICD-10-CM

## 2024-06-11 DIAGNOSIS — M89.671: Primary | ICD-10-CM

## 2024-06-11 DIAGNOSIS — R29.898 RIGHT LEG WEAKNESS: ICD-10-CM

## 2024-06-11 DIAGNOSIS — Z85.828 HISTORY OF BASAL CELL CARCINOMA: ICD-10-CM

## 2024-06-11 DIAGNOSIS — M18.11 ARTHRITIS OF CARPOMETACARPAL (CMC) JOINT OF RIGHT THUMB: ICD-10-CM

## 2024-06-11 PROCEDURE — 99214 OFFICE O/P EST MOD 30 MIN: CPT | Performed by: STUDENT IN AN ORGANIZED HEALTH CARE EDUCATION/TRAINING PROGRAM

## 2024-06-11 PROCEDURE — G2211 COMPLEX E/M VISIT ADD ON: HCPCS | Performed by: STUDENT IN AN ORGANIZED HEALTH CARE EDUCATION/TRAINING PROGRAM

## 2024-06-11 NOTE — PROGRESS NOTES
"  Assessment & Plan      Diagnosis Comments   1. 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 Right leg weakness        3. History of basal cell carcinoma        4. Arthritis of carpometacarpal (CMC) joint of right thumb            Right leg poliomyelitis:  In chronic pain because of this.  Last time, he was started on duloxetine with good effect.  He is noted that his pain is \"more tolerable\"  Is tolerating the medication relatively well except for some sexual side effects  He is wondering if he can switch to once a day dosing rather than twice daily to see if that will medicate some of the sexual side effects.  He is not interested in going up on the dose  Plan:  - Will have him take duloxetine 60 mg in the morning and see how he does in a few months    History of facial BCC:  Patient has new lesion on the left side of his scalp last time and I had advised him to follow-up with dermatology.  He has not done so at this point.  He says he will give them a call as soon as he can    CMC arthritis of right hand  Right carpal tunnel  Improved pain symptoms with the duloxetine and Voltaren gel  Would like to monitor for now and follow-up with orthopedics and August    The longitudinal plan of care for the diagnosis(es)/condition(s) as documented were addressed during this visit. Due to the added complexity in care, I will continue to support Silvano in the subsequent management and with ongoing continuity of care.      32 minutes spent by me on the date of the encounter doing chart review, history and exam, documentation and further activities per the note    Subjective   Silvano is a 83 year old, presenting for the following health issues:  Follow Up (Arthritis/ pain in hands) and Recheck Medication        6/11/2024    12:18 PM   Additional Questions   Roomed by Tere ARCOS MA             Review of Systems  Constitutional, neuro, ENT, endocrine, pulmonary, cardiac, gastrointestinal, genitourinary, " "musculoskeletal, integument and psychiatric systems are negative, except as otherwise noted.      Objective    BP 92/58 (BP Location: Right arm, Patient Position: Sitting, Cuff Size: Adult Regular)   Pulse 81   Temp 97.9  F (36.6  C) (Oral)   Resp 20   Ht 1.727 m (5' 8\")   Wt 81.5 kg (179 lb 9.6 oz)   SpO2 94%   BMI 27.31 kg/m    Body mass index is 27.31 kg/m .  Physical Exam   GENERAL: alert and no distress  PSYCH: mentation appears normal, affect normal/bright          Signed Electronically by: Jennyfer Kendall DO    "

## 2024-08-15 DIAGNOSIS — M18.11 PRIMARY OSTEOARTHRITIS OF FIRST CARPOMETACARPAL JOINT OF RIGHT HAND: ICD-10-CM

## 2024-08-15 DIAGNOSIS — M25.50 POLYARTHRALGIA: ICD-10-CM

## 2024-08-15 RX ORDER — DULOXETIN HYDROCHLORIDE 30 MG/1
30 CAPSULE, DELAYED RELEASE ORAL 2 TIMES DAILY
Qty: 180 CAPSULE | Refills: 3 | Status: SHIPPED | OUTPATIENT
Start: 2024-08-15

## 2024-08-15 NOTE — TELEPHONE ENCOUNTER
Pharmacy calling to get a medication refill on medication(s) attached       Disp Refills Start End KAVON   DULoxetine (CYMBALTA) 30 MG capsule 180 capsule 0 4/9/2024 -- No   Sig - Route: Take 1 capsule (30 mg) by mouth 2 times daily - Oral   Sent to pharmacy as: DULoxetine HCl 30 MG Oral Capsule Delayed Release Particles (CYMBALTA)   Class: E-Prescribe   Order: [id number]

## 2025-03-10 ENCOUNTER — PATIENT OUTREACH (OUTPATIENT)
Dept: CARE COORDINATION | Facility: CLINIC | Age: 85
End: 2025-03-10
Payer: COMMERCIAL

## 2025-03-24 ENCOUNTER — PATIENT OUTREACH (OUTPATIENT)
Dept: CARE COORDINATION | Facility: CLINIC | Age: 85
End: 2025-03-24
Payer: COMMERCIAL

## 2025-04-23 DIAGNOSIS — I10 ESSENTIAL HYPERTENSION, BENIGN: ICD-10-CM

## 2025-04-24 RX ORDER — LOSARTAN POTASSIUM 50 MG/1
50 TABLET ORAL DAILY
Qty: 90 TABLET | Refills: 2 | Status: SHIPPED | OUTPATIENT
Start: 2025-04-24

## 2025-05-22 DIAGNOSIS — I10 ESSENTIAL HYPERTENSION, BENIGN: ICD-10-CM

## 2025-05-22 DIAGNOSIS — E78.49 OTHER HYPERLIPIDEMIA: ICD-10-CM

## 2025-05-22 RX ORDER — METOPROLOL SUCCINATE 50 MG/1
50 TABLET, EXTENDED RELEASE ORAL DAILY
Qty: 90 TABLET | Refills: 0 | Status: SHIPPED | OUTPATIENT
Start: 2025-05-22

## 2025-05-22 NOTE — TELEPHONE ENCOUNTER
Medication Question or Refill        What medication are you calling about (include dose and sig)?:    Disp Refills Start End KAVON   atorvastatin (LIPITOR) 20 MG tablet 90 tablet 3 4/9/2024 -- No   Sig - Route: Take 1 tablet (20 mg) by mouth daily - Oral   Sent to pharmacy as: Atorvastatin Calcium 20 MG Oral Tablet (LIPITOR)   Class: E-Prescribe   Notes to Pharmacy: **HOLD UNTIL REQUESTED**   Order: [id number]   E-Prescribing Status: Receipt confirmed by pharmacy (4/9/2024 12:13 PM CDT)       Preferred Pharmacy:   [place] [address] [place] [address]  Phone: [phone] Fax: [phone]      Controlled Substance Agreement on file:   CSA -- Patient Level:    CSA: None found at the patient level.       Who prescribed the medication?: PCP    Do you need a refill? Yes      Could we send this information to you in [facility] or would you prefer to receive a phone call?:   Patient would prefer a phone call   Okay to leave a detailed message?: Yes at Cell number on file:    Telephone Information:   Mobile [phone]

## 2025-05-22 NOTE — TELEPHONE ENCOUNTER
Medication Question or Refill        What medication are you calling about (include dose and sig)?:    Disp Refills Start End KAVON   metoprolol succinate ER (TOPROL XL) 50 MG 24 hr tablet 90 tablet 3 4/9/2024 -- No   Sig - Route: Take 1 tablet (50 mg) by mouth daily - Oral   Sent to pharmacy as: Metoprolol Succinate ER 50 MG Oral Tablet Extended Release 24 Hour (TOPROL XL)   Class: E-Prescribe   Notes to Pharmacy: **HOLD UNTIL REQUESTED**   Order: [id number]   E-Prescribing Status: Receipt confirmed by pharmacy (4/9/2024 12:13 PM CDT)       Preferred Pharmacy:   [place] [address] [place] [address]  Phone: [phone] Fax: [phone]      Controlled Substance Agreement on file:   CSA -- Patient Level:    CSA: None found at the patient level.       Who prescribed the medication?: PCP    Do you need a refill? Yes    Could we send this information to you in [facility] or would you prefer to receive a phone call?:   Patient would prefer a phone call   Okay to leave a detailed message?: Yes at Cell number on file:    Telephone Information:   Mobile [phone]

## 2025-05-24 RX ORDER — ATORVASTATIN CALCIUM 20 MG/1
20 TABLET, FILM COATED ORAL DAILY
Qty: 90 TABLET | Refills: 0 | Status: SHIPPED | OUTPATIENT
Start: 2025-05-24

## 2025-06-16 SDOH — HEALTH STABILITY: PHYSICAL HEALTH: ON AVERAGE, HOW MANY MINUTES DO YOU ENGAGE IN EXERCISE AT THIS LEVEL?: 30 MIN

## 2025-06-16 SDOH — HEALTH STABILITY: PHYSICAL HEALTH: ON AVERAGE, HOW MANY DAYS PER WEEK DO YOU ENGAGE IN MODERATE TO STRENUOUS EXERCISE (LIKE A BRISK WALK)?: 1 DAY

## 2025-06-16 ASSESSMENT — SOCIAL DETERMINANTS OF HEALTH (SDOH): HOW OFTEN DO YOU GET TOGETHER WITH FRIENDS OR RELATIVES?: TWICE A WEEK

## 2025-06-17 ENCOUNTER — PATIENT OUTREACH (OUTPATIENT)
Dept: CARE COORDINATION | Facility: CLINIC | Age: 85
End: 2025-06-17

## 2025-06-17 ENCOUNTER — MYC MEDICAL ADVICE (OUTPATIENT)
Dept: INTERNAL MEDICINE | Facility: CLINIC | Age: 85
End: 2025-06-17

## 2025-06-17 ENCOUNTER — OFFICE VISIT (OUTPATIENT)
Dept: FAMILY MEDICINE | Facility: CLINIC | Age: 85
End: 2025-06-17
Payer: COMMERCIAL

## 2025-06-17 VITALS
SYSTOLIC BLOOD PRESSURE: 100 MMHG | OXYGEN SATURATION: 97 % | RESPIRATION RATE: 18 BRPM | HEART RATE: 82 BPM | DIASTOLIC BLOOD PRESSURE: 60 MMHG | TEMPERATURE: 98.2 F | WEIGHT: 177.38 LBS | BODY MASS INDEX: 26.97 KG/M2

## 2025-06-17 DIAGNOSIS — E55.9 VITAMIN D DEFICIENCY: ICD-10-CM

## 2025-06-17 DIAGNOSIS — I25.83 CORONARY ATHEROSCLEROSIS DUE TO LIPID RICH PLAQUE: ICD-10-CM

## 2025-06-17 DIAGNOSIS — Z86.0100 HISTORY OF COLONIC POLYPS: ICD-10-CM

## 2025-06-17 DIAGNOSIS — Z12.5 SCREENING FOR PROSTATE CANCER: ICD-10-CM

## 2025-06-17 DIAGNOSIS — Z00.00 ENCOUNTER FOR MEDICARE ANNUAL WELLNESS EXAM: Primary | ICD-10-CM

## 2025-06-17 DIAGNOSIS — M89.662: ICD-10-CM

## 2025-06-17 DIAGNOSIS — B91: ICD-10-CM

## 2025-06-17 DIAGNOSIS — I10 BENIGN ESSENTIAL HYPERTENSION: ICD-10-CM

## 2025-06-17 DIAGNOSIS — Z23 NEED FOR VACCINATION: ICD-10-CM

## 2025-06-17 DIAGNOSIS — R60.0 LOWER EXTREMITY EDEMA: ICD-10-CM

## 2025-06-17 DIAGNOSIS — Z13.6 SCREENING FOR CARDIOVASCULAR CONDITION: ICD-10-CM

## 2025-06-17 DIAGNOSIS — Z23 ENCOUNTER FOR IMMUNIZATION: ICD-10-CM

## 2025-06-17 LAB
ALBUMIN SERPL BCG-MCNC: 4 G/DL (ref 3.5–5.2)
ALP SERPL-CCNC: 74 U/L (ref 40–150)
ALT SERPL W P-5'-P-CCNC: 23 U/L (ref 0–70)
ANION GAP SERPL CALCULATED.3IONS-SCNC: 12 MMOL/L (ref 7–15)
AST SERPL W P-5'-P-CCNC: 30 U/L (ref 0–45)
BILIRUB SERPL-MCNC: 0.9 MG/DL
BUN SERPL-MCNC: 18.2 MG/DL (ref 8–23)
CALCIUM SERPL-MCNC: 9.7 MG/DL (ref 8.8–10.4)
CHLORIDE SERPL-SCNC: 104 MMOL/L (ref 98–107)
CHOLEST SERPL-MCNC: 111 MG/DL
CREAT SERPL-MCNC: 0.76 MG/DL (ref 0.67–1.17)
EGFRCR SERPLBLD CKD-EPI 2021: 90 ML/MIN/1.73M2
ERYTHROCYTE [DISTWIDTH] IN BLOOD BY AUTOMATED COUNT: 12.2 % (ref 10–15)
FASTING STATUS PATIENT QL REPORTED: NORMAL
FASTING STATUS PATIENT QL REPORTED: NORMAL
GLUCOSE SERPL-MCNC: 99 MG/DL (ref 70–99)
HCO3 SERPL-SCNC: 25 MMOL/L (ref 22–29)
HCT VFR BLD AUTO: 47.4 % (ref 40–53)
HDLC SERPL-MCNC: 43 MG/DL
HGB BLD-MCNC: 15.4 G/DL (ref 13.3–17.7)
LDLC SERPL CALC-MCNC: 39 MG/DL
MCH RBC QN AUTO: 31.9 PG (ref 26.5–33)
MCHC RBC AUTO-ENTMCNC: 32.5 G/DL (ref 31.5–36.5)
MCV RBC AUTO: 98 FL (ref 78–100)
NONHDLC SERPL-MCNC: 68 MG/DL
PLATELET # BLD AUTO: 185 10E3/UL (ref 150–450)
POTASSIUM SERPL-SCNC: 4.3 MMOL/L (ref 3.4–5.3)
PROT SERPL-MCNC: 6.6 G/DL (ref 6.4–8.3)
PSA SERPL DL<=0.01 NG/ML-MCNC: 1.13 NG/ML
RBC # BLD AUTO: 4.83 10E6/UL (ref 4.4–5.9)
SODIUM SERPL-SCNC: 141 MMOL/L (ref 135–145)
TRIGL SERPL-MCNC: 143 MG/DL
TSH SERPL DL<=0.005 MIU/L-ACNC: 1.49 UIU/ML (ref 0.3–4.2)
VIT D+METAB SERPL-MCNC: 23 NG/ML (ref 20–50)
WBC # BLD AUTO: 7.4 10E3/UL (ref 4–11)

## 2025-06-17 PROCEDURE — 80061 LIPID PANEL: CPT

## 2025-06-17 PROCEDURE — 36415 COLL VENOUS BLD VENIPUNCTURE: CPT

## 2025-06-17 PROCEDURE — 84443 ASSAY THYROID STIM HORMONE: CPT

## 2025-06-17 PROCEDURE — G0103 PSA SCREENING: HCPCS

## 2025-06-17 PROCEDURE — 82306 VITAMIN D 25 HYDROXY: CPT

## 2025-06-17 PROCEDURE — 85027 COMPLETE CBC AUTOMATED: CPT

## 2025-06-17 PROCEDURE — 80053 COMPREHEN METABOLIC PANEL: CPT

## 2025-06-17 RX ORDER — LIDOCAINE 50 MG/G
1 PATCH TOPICAL EVERY 24 HOURS
Qty: 15 PATCH | Refills: 1 | Status: SHIPPED | OUTPATIENT
Start: 2025-06-17

## 2025-06-17 RX ORDER — LIDOCAINE 40 MG/G
CREAM TOPICAL
Qty: 120 G | Refills: 1 | Status: SHIPPED | OUTPATIENT
Start: 2025-06-17

## 2025-06-17 ASSESSMENT — PAIN SCALES - GENERAL: PAINLEVEL_OUTOF10: MODERATE PAIN (4)

## 2025-06-17 NOTE — TELEPHONE ENCOUNTER
Attempted to reach patient to: Reschedule an appointment    When patient returns call, please take this action: Assist with rescheduling    Reason for the reschedule: AWV was scheduled as a VV, this is incorrect    Date/time of appointment that needs to be rescheduled: 06/17/25    Reason for the visit: Preventive    When to reschedule: Next available see below    Additional comments/info: PCP OKAYED FOR PT TO BE SEEN IN PERSON TODAY. Arrival time: 340p.     If unable to reschedule: Transfer to  line (or update telephone encounter in after-hours)      Silvano Hill Jr., CMA on 6/17/2025 at 9:17 AM

## 2025-06-17 NOTE — PROGRESS NOTES
Preventive Care Visit  [facility] and Homenarda Kendall DO, Family Medicine  Jun 17, 2025  {Provider  Link to [facility] :330957}    Assessment & Plan     {Diaeber Picklist:030322}         Patient is a pleasant 82-year-old male with left leg poliomyelitis osteopathy, SCC of the face, brachial plexopathy, essential hypertension, who presents today for an annual physical.     Home life: Lives with wife and kids.  Occupation: Retired  Sexually active: No  Safety concerns: None identified  Diet/exercise: Significantly limited due to his polymyositis.  Tries to stay as active as he can and work on a daily basis.  Family history of cancers: father had lung cancer ( smoker) , brother has barretts   Eye exam/dental exam: UTD  Alcohol use: daily, 1 drink   Tobacco use/marijuana use/drug use:no   Mental health: Stable  Vaccines: Will get shingles vaccine at the pharmacy.  Flu and COVID-vaccine given today.   blood work: Ordered                   Colon cancer screening:last in 11/2022 --> (+) tubular adenoma without any high grade dysplasia. No clear return date on  Report. Per patient, return in 3  Years.   PSA: Ordered     IADLs/ADLs intact: Slightly impaired due to right leg polymyositis but is able to do most of his ADLs.    Driving: yes   Memory:no issues   Constipation, vision, hearing concerns: no concerns   Falls over the last year: (+) near falls 2/2 poliomyelitis.        Right leg poliomyelitis:     History of facial BCC:   ***  Tip of the ose       Stomach problems     Vltren el     Right sided brachialplexpahy     NSAID use           {Follow-up (Optional):724962}    Finesse Schaefer is a 84 year old, presenting for the following:  Annual Visit (Arthritic pain in hands and elbows)        6/17/2025     3:38 PM   Additional Questions   Roomed by Idania   Accompanied by Self         6/17/2025     3:38 PM   Patient Reported Additional Medications   Patient reports taking the following new medications none           HPI  ***   {MA/LPN/RN Pre-Provider Visit Orders- hCG/UA/Strep (Optional):777931}  {SUPERLIST (Optional):718176}  {additonal problems for provider to add (Optional):126625}  Advance Care Planning  {The storyboard will display whether the patient has ACP docs on file. Hover over the Code section in the storyboard to access the ACP documents. :808799}  {(AWV REQUIRED) Advance Care Planning Reviewed:842873}        6/16/2025   General Health   How would you rate your overall physical health? Good   Feel stress (tense, anxious, or unable to sleep) Only a little   (!) STRESS CONCERN      6/16/2025   Nutrition   Diet: Regular (no restrictions)         6/16/2025   Exercise   Days per week of moderate/strenous exercise 1 day   Average minutes spent exercising at this level 30 min   (!) EXERCISE CONCERN      6/16/2025   Social Factors   Frequency of gathering with friends or relatives Twice a week   Worry food won't last until get money to buy more No   Food not last or not have enough money for food? No   Do you have housing? (Housing is defined as stable permanent housing and does not include staying outside in a car, in a tent, in an abandoned building, in an overnight shelter, or couch-surfing.) Yes   Are you worried about losing your housing? No   Lack of transportation? No   Unable to get utilities (heat,electricity)? No         6/17/2025   Fall Risk   Reason Gait Speed Test Not Completed Patient declines   Reason for decline Has a walker          6/16/2025   Activities of Daily Living- Home Safety   Needs help with the following daily activites Shopping   Safety concerns in the home None of the above         6/16/2025   Dental   Dentist two times every year? (!) NO         6/16/2025   Hearing Screening   Hearing concerns? None of the above         6/16/2025   Driving Risk Screening   Patient/family members have concerns about driving No         6/16/2025   General Alertness/Fatigue Screening   Have you been  more tired than usual lately? No         6/16/2025   Urinary Incontinence Screening   Bothered by leaking urine in past 6 months No         Today's PHQ-2 Score:       6/16/2025     5:48 PM   PHQ-2 ( 1999 Pf[employer])   Q1: Little interest or pleasure in doing things 0   Q2: Feeling down, depressed or hopeless 0   PHQ-2 Score 0    Q1: Little interest or pleasure in doing things Not at all   Q2: Feeling down, depressed or hopeless Not at all   PHQ-2 Score 0       Patient-reported           6/16/2025   Substance Use   Alcohol more than 3/day or more than 7/wk No   Do you have a current opioid prescription? No   How severe/bad is pain from 1 to 10? 6/10   Do you use any other substances recreationally? No     Social History     Tobacco Use    Smoking status: Never     Passive exposure: Never    Smokeless tobacco: Never   Vaping Use    Vaping status: Never Used   Substance Use Topics    Alcohol use: Yes     Comment: Alcoholic Drinks/day: alittle    Drug use: Never     {Provider  If there are gaps in the social history shown above, please follow the link to update and then refresh the note Link to Social and Substance History :706024}    {Link to Fracture Risk Assessment Tool (Optional):629953}    {Provider  REQUIRED FOR AWV Use the storyboard to review patient history, after sections have been marked as reviewed, refresh note to capture documentation:987282}  {Provider   REQUIRED AWV use this link to review and update sexual activity history  after section has been marked as reviewed, refresh note to capture documentation:806209}  Reviewed and updated as needed this visit by Provider                    {HISTORY OPTIONS (Optional):023520}  Current providers sharing in care for this patient include:  Patient Care Team:  Jennyfer Kendall DO as PCP - General (Family Medicine)  Sundar Chavez MD as MD (Orthopedics)  Favian Griffith DPM (Podiatry)  Jennyfer Kendall DO as Assigned PCP    The following [facility]  "maintenance items are reviewed in Epic and correct as of today:  Health Maintenance   Topic Date Due    ZOSTER VACCINE (1 of 2) Never done    RSV VACCINE (1 - 1-dose 75+ series) Never done    MEDICARE ANNUAL WELLNESS VISIT  04/09/2025    BMP  04/09/2025    LIPID  04/09/2025    ANNUAL REVIEW OF HM ORDERS  04/09/2025    COVID-19 VACCINE (8 - 2024-25 season) 04/15/2025    FALL RISK ASSESSMENT  06/17/2026    ADVANCE CARE PLANNING  04/09/2029    COLORECTAL CANCER SCREENING  11/07/2029    DTAP/TDAP/TD VACCINE (5 - Td or Tdap) 10/05/2032    PHQ-2 (once per calendar year)  Completed    INFLUENZA VACCINE  Completed    PNEUMOCOCCAL VACCINE 50+ YEARS  Completed    HPV VACCINE  Aged Out    MENINGITIS VACCINE  Aged Out       {ROS Picklists (Optional):766475}     Objective    Exam  /60 (BP Location: Right arm, Patient Position: Sitting, Cuff Size: Adult Regular)   Pulse 82   Temp 98.2  F (36.8  C) (Oral)   Resp 18   Wt 80.5 kg (177 lb 6 oz)   SpO2 97%   BMI 26.97 kg/m     Estimated body mass index is 26.97 kg/m  as calculated from the following:    Height as of 6/11/24: 1.727 m (5' 8\").    Weight as of this encounter: 80.5 kg (177 lb 6 oz).    Physical Exam  {Exam Choices (Optional):366878}  Gait and balance assessed per Gait Speed Test.  Result as above.        6/17/2025   Mini Cog   Clock Draw Score 2 Normal   3 Item Recall 3 objects recalled   Mini Cog Total Score 5     {A Mini-Cog total score of 0-2 suggests the possibility of dementia, score of 3-5 suggests no dementia:753061}         Signed Electronically by: Jennyfer Kendall DO  {Email feedback regarding this note to [email or web address]   :731148}  "    ORTHOPEDIC SURGERY      right total knee    TOTAL KNEE ARTHROPLASTY Right 2009     OB History   No obstetric history on file.     Lab work is in process  Labs reviewed in EPIC  BP Readings from Last 3 Encounters:   06/17/25 100/60   06/11/24 92/58   04/09/24 98/58    Wt Readings from Last 3 Encounters:   06/17/25 80.5 kg (177 lb 6 oz)   06/11/24 81.5 kg (179 lb 9.6 oz)   04/09/24 85.1 kg (187 lb 9.6 oz)                  Patient Active Problem List   Diagnosis    Pain in joint involving ankle and foot    Impaired gait    Benign essential hypertension    Brachial plexopathy    Coronary atherosclerosis    History of heart artery stent    History of poliomyelitis    Hyperlipidemia    Incomplete bladder emptying    Osteoarthrosis    Scoliosis    Seborrheic keratosis    Difficulty walking    Squamous cell carcinoma of skin of face    Right leg weakness    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)    Benign neoplasm of ascending colon    Diverticular disease of large intestine    History of colonic polyps    History of MI (myocardial infarction)     Past Surgical History:   Procedure Laterality Date    ARTHRODESIS ANKLE Right 1/14/2015    Procedure: ARTHRODESIS ANKLE;  Surgeon: Sundar Chavez MD;  [facility]    ARTHRODESIS ANKLE Bilateral Right 2015, Left at age 15    ARTHROPLASTY KNEE      Right    CARDIAC SURGERY      LAD stent    CATARACT IOL, RT/LT      COLONOSCOPY N/A 11/7/2022    Procedure: COLONOSCOPY with cold polypectomies;  Surgeon: Jaycob Crane MD;  Location: [facility] GI    ENT SURGERY      tonsillectomy 7 yo    IR LUMBAR EPIDURAL STEROID INJECTION  11/3/2009    IR LUMBAR EPIDURAL STEROID INJECTION  3/9/2010    ORTHOPEDIC SURGERY      left foot arthrodesis when 14 yo    ORTHOPEDIC SURGERY      right total knee    TOTAL KNEE ARTHROPLASTY Right 2009       Social History     Tobacco Use    Smoking status: Never     Passive exposure: Never    Smokeless tobacco: Never    Substance Use Topics    Alcohol use: Yes     Comment: Alcoholic Drinks/day: symonele     Family History   Problem Relation Age of Onset    Cerebrovascular Disease Mother     Lung Cancer Father     Harley's esophagus Father     Diabetes No family hx of     Glaucoma No family hx of     Macular Degeneration No family hx of          Current Outpatient Medications   Medication Sig Dispense Refill    aspirin 81 MG tablet Take 81 mg by mouth daily      atorvastatin (LIPITOR) 20 MG tablet Take 1 tablet (20 mg) by mouth daily. 90 tablet 0    lidocaine (LIDODERM) 5 % patch Place 1 patch over 12 hours onto the skin every 24 hours. To prevent lidocaine toxicity, patient should be patch free for 12 hrs daily. 15 patch 1    lidocaine (LMX4) 4 % external cream Apply topically once as needed for mild pain. 120 g 1    losartan (COZAAR) 50 MG tablet Take 1 tablet (50 mg) by mouth daily. 90 tablet 2    metoprolol succinate ER (TOPROL XL) 50 MG 24 hr tablet Take 1 tablet (50 mg) by mouth daily. 90 tablet 0    tamsulosin (FLOMAX) 0.4 MG capsule Take 1 capsule (0.4 mg) by mouth daily. 90 capsule 3     Allergies   Allergen Reactions    Strawberry Extract Hives     Recent Labs   Lab Test 06/17/25  1652 04/09/24  1234 01/24/23  1300 10/18/21  0929 08/20/20  1039 09/23/19  1445   LDL 39 41 43   < > 53 46   HDL 43 44 49   < > 43 44   TRIG 143 128 100   < > 99 107   ALT 23 32  --   --  27 24   CR 0.76 0.72 0.64*   < > 0.75 0.71   GFRESTIMATED 90 >90 >90   < > >60 >60   GFRESTBLACK  --   --   --   --  >60 >60   POTASSIUM 4.3 4.5 4.3   < > 4.2 4.4   TSH 1.49  --   --   --   --   --     < > = values in this interval not displayed.           Current providers sharing in care for this patient include:  Patient Care Team:  Jennyfer Kendall DO as PCP - General (Family Medicine)  Sundar Chavez MD as MD (Orthopedics)  Favian Griffith DPM (Podiatry)  Jennyfer Kendall DO as Assigned PCP    The following health maintenance items are  "reviewed in Epic and correct as of today:  Health Maintenance   Topic Date Due    ZOSTER VACCINE (1 of 2) Never done    RSV VACCINE (1 - 1-dose 75+ series) Never done    MEDICARE ANNUAL WELLNESS VISIT  04/09/2025    BMP  06/17/2026    LIPID  06/17/2026    ANNUAL REVIEW OF HM ORDERS  06/17/2026    FALL RISK ASSESSMENT  06/17/2026    ADVANCE CARE PLANNING  04/09/2029    COLORECTAL CANCER SCREENING  11/07/2029    DTAP/TDAP/TD VACCINE (5 - Td or Tdap) 10/05/2032    PHQ-2 (once per calendar year)  Completed    INFLUENZA VACCINE  Completed    PNEUMOCOCCAL VACCINE 50+ YEARS  Completed    COVID-19 VACCINE  Completed    HPV VACCINE  Aged Out    MENINGITIS VACCINE  Aged Out         Review of Systems  Constitutional, neuro, ENT, endocrine, pulmonary, cardiac, gastrointestinal, genitourinary, musculoskeletal, integument and psychiatric systems are negative, except as otherwise noted.     Objective    Exam  /60 (BP Location: Right arm, Patient Position: Sitting, Cuff Size: Adult Regular)   Pulse 82   Temp 98.2  F (36.8  C) (Oral)   Resp 18   Wt 80.5 kg (177 lb 6 oz)   SpO2 97%   BMI 26.97 kg/m     Estimated body mass index is 26.97 kg/m  as calculated from the following:    Height as of 6/11/24: 1.727 m (5' 8\").    Weight as of this encounter: 80.5 kg (177 lb 6 oz).    Physical Exam  GENERAL: alert and no distress  NECK: no adenopathy, no asymmetry, masses, or scars  RESP: lungs clear to auscultation - no rales, rhonchi or wheezes  CV: regular rate and rhythm,no murmur, click or rub, no peripheral edema  MS: no gross musculoskeletal defects noted, no edema  Uses hiking sticks at baseline  Has right leg brace  Has osteoarthritis related nodules on his hands and thumb stiffness bilaterally  2=3+ bilateral ankle swelling up to distal calf bilaterally. No TTP  PSYCH: mentation appears normal, affect normal/bright  Gait and balance assessed per Gait Speed Test.  Result as above.        6/17/2025   Mini Cog   Clock Draw " Score 2 Normal   3 Item Recall 3 objects recalled   Mini Cog Total Score 5              Signed Electronically by: Jennyfer Kendall DO

## 2025-06-17 NOTE — TELEPHONE ENCOUNTER
Patient calling back, relayed that this appt should be in person. Patient OK with coming in. Switched appt to In person

## 2025-06-18 ENCOUNTER — RESULTS FOLLOW-UP (OUTPATIENT)
Dept: FAMILY MEDICINE | Facility: CLINIC | Age: 85
End: 2025-06-18

## 2025-06-19 ENCOUNTER — TELEPHONE (OUTPATIENT)
Dept: FAMILY MEDICINE | Facility: CLINIC | Age: 85
End: 2025-06-19
Payer: COMMERCIAL

## 2025-06-26 NOTE — TELEPHONE ENCOUNTER
Retail Pharmacy Prior Authorization Team  Phone: [phone]    PA Initiation    Medication: LIDOCAINE 5 % EX PTCH  Insurance Company: [employer]. - Phone [phone] Fax [phone]  Pharmacy Filling the Rx:    Filling Pharmacy Phone:    Filling Pharmacy Fax:    Start Date: 6/26/2025

## 2025-06-27 NOTE — PATIENT INSTRUCTIONS
Patient Education   Preventive Care Advice   This is general advice given by our system to help you stay healthy. However, your care team may have specific advice just for you. Please talk to your care team about your preventive care needs.  Nutrition  Eat 5 or more servings of fruits and vegetables each day.  Try wheat bread, brown rice and whole grain pasta (instead of white bread, rice, and pasta).  Get enough calcium and vitamin D. Check the label on foods and aim for 100% of the RDA (recommended daily allowance).  Lifestyle  Exercise at least 150 minutes each week  (30 minutes a day, 5 days a week).  Do muscle strengthening activities 2 days a week. These help control your weight and prevent disease.  No smoking.  Wear sunscreen to prevent skin cancer.  Have a dental exam and cleaning every 6 months.  Yearly exams  See your health care team every year to talk about:  Any changes in your health.  Any medicines your care team has prescribed.  Preventive care, family planning, and ways to prevent chronic diseases.  Shots (vaccines)   HPV shots (up to age 26), if you've never had them before.  Hepatitis B shots (up to age 59), if you've never had them before.  COVID-19 shot: Get this shot when it's due.  Flu shot: Get a flu shot every year.  Tetanus shot: Get a tetanus shot every 10 years.  Pneumococcal, hepatitis A, and RSV shots: Ask your care team if you need these based on your risk.  Shingles shot (for age 50 and up)  General health tests  Diabetes screening:  Starting at age 35, Get screened for diabetes at least every 3 years.  If you are younger than age 35, ask your care team if you should be screened for diabetes.  Cholesterol test: At age 39, start having a cholesterol test every 5 years, or more often if advised.  Bone density scan (DEXA): At age 50, ask your care team if you should have this scan for osteoporosis (brittle bones).  Hepatitis C: Get tested at least once in your life.  STIs (sexually  transmitted infections)  Before age 24: Ask your care team if you should be screened for STIs.  After age 24: Get screened for STIs if you're at risk. You are at risk for STIs (including HIV) if:  You are sexually active with more than one person.  You don't use condoms every time.  You or a partner was diagnosed with a sexually transmitted infection.  If you are at risk for HIV, ask about PrEP medicine to prevent HIV.  Get tested for HIV at least once in your life, whether you are at risk for HIV or not.  Cancer screening tests  Cervical cancer screening: If you have a cervix, begin getting regular cervical cancer screening tests starting at age 21.  Breast cancer scan (mammogram): If you've ever had breasts, begin having regular mammograms starting at age 40. This is a scan to check for breast cancer.  Colon cancer screening: It is important to start screening for colon cancer at age 45.  Have a colonoscopy test every 10 years (or more often if you're at risk) Or, ask your provider about stool tests like a FIT test every year or Cologuard test every 3 years.  To learn more about your testing options, visit:   .  For help making a decision, visit:   https://bit.ly/ml87609.  Prostate cancer screening test: If you have a prostate, ask your care team if a prostate cancer screening test (PSA) at age 55 is right for you.  Lung cancer screening: If you are a current or former smoker ages 50 to 80, ask your care team if ongoing lung cancer screenings are right for you.  For informational purposes only. Not to replace the advice of your health care provider. Copyright   2023 [place] [employer]. All rights reserved. Clinically reviewed by the [facility] Transitions Program. [employer] 083050 - REV 01/24.

## 2025-07-02 NOTE — TELEPHONE ENCOUNTER
Retail Pharmacy Prior Authorization Team  Phone: [phone]    PRIOR AUTHORIZATION DENIED    Medication: LIDOCAINE 5 % EX PTCH  Insurance Company: [employer] - Phone [phone] Fax [phone]  Denial Date: 6/27/2025  Denial Reason(s):         Appeal Information:         Patient Notified: NO- Unfortunately, we cannot call the patient with denials because we do not know what next steps the MD will take nor can we give medical advice, please notify the patient of what they are to expect for the continuation of their therapy from the provider.

## 2025-08-25 DIAGNOSIS — I10 ESSENTIAL HYPERTENSION, BENIGN: ICD-10-CM

## 2025-08-25 DIAGNOSIS — E78.49 OTHER HYPERLIPIDEMIA: ICD-10-CM

## 2025-08-25 RX ORDER — ATORVASTATIN CALCIUM 20 MG/1
20 TABLET, FILM COATED ORAL DAILY
Qty: 90 TABLET | Refills: 2 | Status: SHIPPED | OUTPATIENT
Start: 2025-08-25

## 2025-08-25 RX ORDER — METOPROLOL SUCCINATE 50 MG/1
50 TABLET, EXTENDED RELEASE ORAL DAILY
Qty: 90 TABLET | Refills: 2 | Status: SHIPPED | OUTPATIENT
Start: 2025-08-25

## (undated) DEVICE — KIT CONNECTOR FOR OLYMPUS ENDOSCOPES DEFENDO 100310

## (undated) DEVICE — CONNECTOR ONE-LINK INJECTION SITE LF 7N8399

## (undated) DEVICE — KIT SCOPE CLEANING ENDOSCOPY BX00719705

## (undated) DEVICE — SUCTION CANISTER 1000ML 65651-510

## (undated) DEVICE — CANNULA NASAL COMFORT SOFT 25FT 0537

## (undated) DEVICE — ENDO SNARE EXACTO COLD 9MM LOOP 2.4MMX230CM 00711115

## (undated) DEVICE — TUBING ENDOGATOR + H2O PORT CO

## (undated) DEVICE — SWABCAP DISINFECTANT GREEN CFF10-250

## (undated) DEVICE — KIT IV START DYNDV1431

## (undated) DEVICE — SYR 03ML LL W/O NDL 309657

## (undated) DEVICE — SOL WATER IRRIG 500ML BOTTLE 2F7113